# Patient Record
Sex: FEMALE | HISPANIC OR LATINO | Employment: PART TIME | URBAN - METROPOLITAN AREA
[De-identification: names, ages, dates, MRNs, and addresses within clinical notes are randomized per-mention and may not be internally consistent; named-entity substitution may affect disease eponyms.]

---

## 2022-05-03 ENCOUNTER — OCCMED (OUTPATIENT)
Dept: URGENT CARE | Facility: CLINIC | Age: 27
End: 2022-05-03
Payer: OTHER MISCELLANEOUS

## 2022-05-03 DIAGNOSIS — S16.1XXA CERVICAL STRAIN, ACUTE, INITIAL ENCOUNTER: Primary | ICD-10-CM

## 2022-05-03 PROCEDURE — G0382 LEV 3 HOSP TYPE B ED VISIT: HCPCS | Performed by: PHYSICIAN ASSISTANT

## 2022-05-03 PROCEDURE — 99283 EMERGENCY DEPT VISIT LOW MDM: CPT | Performed by: PHYSICIAN ASSISTANT

## 2022-05-03 RX ORDER — CYCLOBENZAPRINE HCL 10 MG
5 TABLET ORAL
Qty: 5 TABLET | Refills: 0 | Status: SHIPPED | OUTPATIENT
Start: 2022-05-03

## 2022-05-06 ENCOUNTER — APPOINTMENT (OUTPATIENT)
Dept: RADIOLOGY | Facility: MEDICAL CENTER | Age: 27
End: 2022-05-06
Payer: OTHER MISCELLANEOUS

## 2022-05-06 ENCOUNTER — OCCMED (OUTPATIENT)
Dept: URGENT CARE | Facility: MEDICAL CENTER | Age: 27
End: 2022-05-06
Payer: OTHER MISCELLANEOUS

## 2022-05-06 DIAGNOSIS — S16.1XXD CERVICAL STRAIN, ACUTE, SUBSEQUENT ENCOUNTER: ICD-10-CM

## 2022-05-06 DIAGNOSIS — S16.1XXD CERVICAL STRAIN, ACUTE, SUBSEQUENT ENCOUNTER: Primary | ICD-10-CM

## 2022-05-06 PROCEDURE — 99213 OFFICE O/P EST LOW 20 MIN: CPT | Performed by: PHYSICIAN ASSISTANT

## 2022-05-06 PROCEDURE — 72040 X-RAY EXAM NECK SPINE 2-3 VW: CPT

## 2022-05-09 ENCOUNTER — APPOINTMENT (OUTPATIENT)
Dept: URGENT CARE | Facility: MEDICAL CENTER | Age: 27
End: 2022-05-09
Payer: OTHER MISCELLANEOUS

## 2022-05-09 PROCEDURE — 99213 OFFICE O/P EST LOW 20 MIN: CPT | Performed by: PHYSICIAN ASSISTANT

## 2022-05-13 ENCOUNTER — APPOINTMENT (OUTPATIENT)
Dept: URGENT CARE | Facility: MEDICAL CENTER | Age: 27
End: 2022-05-13
Payer: OTHER MISCELLANEOUS

## 2022-05-13 PROCEDURE — 99213 OFFICE O/P EST LOW 20 MIN: CPT | Performed by: PHYSICIAN ASSISTANT

## 2022-05-17 ENCOUNTER — APPOINTMENT (OUTPATIENT)
Dept: URGENT CARE | Facility: MEDICAL CENTER | Age: 27
End: 2022-05-17
Payer: OTHER MISCELLANEOUS

## 2022-05-17 PROCEDURE — 99213 OFFICE O/P EST LOW 20 MIN: CPT | Performed by: PHYSICIAN ASSISTANT

## 2022-06-10 ENCOUNTER — OFFICE VISIT (OUTPATIENT)
Dept: FAMILY MEDICINE CLINIC | Facility: CLINIC | Age: 27
End: 2022-06-10

## 2022-06-10 VITALS
TEMPERATURE: 97.9 F | DIASTOLIC BLOOD PRESSURE: 78 MMHG | SYSTOLIC BLOOD PRESSURE: 124 MMHG | HEART RATE: 52 BPM | HEIGHT: 64 IN | BODY MASS INDEX: 20.96 KG/M2 | WEIGHT: 122.8 LBS | OXYGEN SATURATION: 98 %

## 2022-06-10 DIAGNOSIS — Z76.89 ENCOUNTER TO ESTABLISH CARE WITH NEW DOCTOR: Primary | ICD-10-CM

## 2022-06-10 DIAGNOSIS — B35.1 ONYCHOMYCOSIS: ICD-10-CM

## 2022-06-10 PROCEDURE — 99203 OFFICE O/P NEW LOW 30 MIN: CPT | Performed by: FAMILY MEDICINE

## 2022-06-10 NOTE — PROGRESS NOTES
Assessment/Plan:    No problem-specific Assessment & Plan notes found for this encounter  Diagnoses and all orders for this visit:    Encounter to establish care with new doctor    Onychomycosis  Discussed nail cut backs, antifungal topical treatment and vitamin supplementation to support nail growth  -     ciclopirox (PENLAC) 8 % solution; Apply topically daily at bedtime        Subjective:      Patient ID: Juan Luis Tirado is a 28 y o  female  HPI     Patient presents to the office to establish care  States that she does not want to complete her physical today  She denies any medical problems  Does not take any medications  States that she has a toenail fungus on her right great toe  This has been present for awhile  She has not used any medications  States that she has been trimming it back  The following portions of the patient's history were reviewed and updated as appropriate:   She  has no past medical history on file  She There are no problems to display for this patient  She  has no past surgical history on file  Her family history includes Diabetes in her maternal grandfather  She  reports that she has never smoked  She has never used smokeless tobacco  She reports current alcohol use  She reports that she does not use drugs  Current Outpatient Medications   Medication Sig Dispense Refill    ciclopirox (PENLAC) 8 % solution Apply topically daily at bedtime 6 6 mL 0    cyclobenzaprine (FLEXERIL) 10 mg tablet Take 0 5 tablets (5 mg total) by mouth daily at bedtime (Patient not taking: Reported on 6/10/2022) 5 tablet 0     No current facility-administered medications for this visit  She has No Known Allergies       Review of Systems   Constitutional: Negative for activity change, appetite change, chills, fatigue and fever  HENT: Negative for congestion, ear pain, rhinorrhea and sore throat  Respiratory: Negative for cough and shortness of breath      Cardiovascular: Negative for chest pain and leg swelling  Gastrointestinal: Negative for abdominal distention, abdominal pain, constipation, diarrhea, nausea and vomiting  Genitourinary: Negative for dysuria, frequency and urgency  Musculoskeletal: Negative for gait problem  Skin: Negative for rash  Neurological: Negative for dizziness, light-headedness and headaches  Objective:  /78 (BP Location: Left arm, Patient Position: Sitting, Cuff Size: Adult)   Pulse (!) 52   Temp 97 9 °F (36 6 °C)   Ht 5' 4" (1 626 m)   Wt 55 7 kg (122 lb 12 8 oz)   SpO2 98%   BMI 21 08 kg/m²      Physical Exam  Vitals reviewed  Constitutional:       General: She is not in acute distress  Appearance: Normal appearance  HENT:      Head: Normocephalic and atraumatic  Right Ear: External ear normal       Left Ear: External ear normal       Mouth/Throat:      Mouth: Mucous membranes are moist    Eyes:      Extraocular Movements: Extraocular movements intact  Conjunctiva/sclera: Conjunctivae normal    Cardiovascular:      Rate and Rhythm: Normal rate and regular rhythm  Heart sounds: Normal heart sounds  Pulmonary:      Effort: Pulmonary effort is normal       Breath sounds: No wheezing, rhonchi or rales  Musculoskeletal:      Right lower leg: No edema  Left lower leg: No edema  Skin:     General: Skin is warm  Capillary Refill: Capillary refill takes less than 2 seconds  Comments: Right great tow with white/yellow discoloration, mild thickening distally  Portion of the toenail has been cut back  No involvement of the cuticle  No redness  Neurological:      Mental Status: She is alert  Mental status is at baseline             DO Sara Foote [facility]  6/10/2022 8:45 AM

## 2023-01-24 ENCOUNTER — OFFICE VISIT (OUTPATIENT)
Dept: OBGYN CLINIC | Facility: CLINIC | Age: 28
End: 2023-01-24

## 2023-01-24 VITALS
DIASTOLIC BLOOD PRESSURE: 73 MMHG | HEART RATE: 76 BPM | WEIGHT: 121 LBS | SYSTOLIC BLOOD PRESSURE: 115 MMHG | BODY MASS INDEX: 20.66 KG/M2 | HEIGHT: 64 IN

## 2023-01-24 DIAGNOSIS — N76.0 BACTERIAL VAGINOSIS: Primary | ICD-10-CM

## 2023-01-24 DIAGNOSIS — B96.89 BACTERIAL VAGINOSIS: Primary | ICD-10-CM

## 2023-01-24 DIAGNOSIS — N76.0 VAGINAL INFECTION: ICD-10-CM

## 2023-01-24 LAB — SL AMB POCT URINE HCG: NEGATIVE

## 2023-01-24 RX ORDER — METRONIDAZOLE 500 MG/1
500 TABLET ORAL EVERY 12 HOURS SCHEDULED
Qty: 14 TABLET | Refills: 0 | Status: SHIPPED | OUTPATIENT
Start: 2023-01-24 | End: 2023-01-31

## 2023-01-24 NOTE — ASSESSMENT & PLAN NOTE
-Patient presents with c/o of vaginal discharge, irritation and dysuria  Onset 3 weeks ago  No other acute or systemic complains  Patient declines STD testing    - POCT UA: Unremarkable, negative leukocytes or nitrites  POCT Hcg: Negative  - Clue cells noted on wet mount and basic PH noted consistent with BV   - Will treat with Metronidazole 500mg BID X 7 days  - Discussed with patient about avoiding of scented soaps to vaginal area/hygiene after sexual intercourse

## 2023-01-24 NOTE — PROGRESS NOTES
Assessment/Plan:    Bacterial vaginosis  -Patient presents with c/o of vaginal discharge, irritation and dysuria  Onset 3 weeks ago  No other acute or systemic complains  Patient declines STD testing    - POCT UA: Unremarkable, negative leukocytes or nitrites  POCT Hcg: Negative  - Clue cells noted on wet mount and basic PH noted consistent with BV   - Will treat with Metronidazole 500mg BID X 7 days  - Discussed with patient about avoiding of scented soaps to vaginal area/hygiene after sexual intercourse  Diagnoses and all orders for this visit:    Bacterial vaginosis    Vaginal infection  -     POCT urine HCG  -     metroNIDAZOLE (FLAGYL) 500 mg tablet; Take 1 tablet (500 mg total) by mouth every 12 (twelve) hours for 7 days          Subjective:      Patient ID: Tomasz Luis is a 33 y o  female  Patient is a 33 Y O Female who [facility] due to concerns of vaginal discharge and irritation  She reports symptoms have been ongoing for the past 2- 3 weeks  She reports large amount of discharge, requiring her to wear pads but denies vaginal odor but burning with urination  She has not tried any medication to help with symptoms  Denies changes to soap, detergent etc and any other systemic symptoms  Currently sexually active with one male partner  LMP: 2023  She reports that periods are regular  She has no children, but reports having an  about 12 years ago  The following portions of the patient's history were reviewed and updated as appropriate: allergies, current medications, past family history, past medical history, past social history, past surgical history and problem list     Review of Systems   Constitutional: Negative for chills and fever  Genitourinary: Positive for dysuria and vaginal discharge  Negative for difficulty urinating, dyspareunia, frequency, hematuria, menstrual problem and vaginal bleeding           Objective:      BP 115/73   Pulse 76   Ht 5' 4" (1 626 m)   Wt 54 9 kg (121 lb)   LMP 01/07/2023   BMI 20 77 kg/m²          Physical Exam  Vitals reviewed  Constitutional:       General: She is not in acute distress  Appearance: Normal appearance  She is not ill-appearing, toxic-appearing or diaphoretic  HENT:      Head: Normocephalic and atraumatic  Cardiovascular:      Rate and Rhythm: Normal rate and regular rhythm  Pulses: Normal pulses  Pulmonary:      Effort: Pulmonary effort is normal  No respiratory distress  Genitourinary:     General: Normal vulva  Vagina: Vaginal discharge present  Comments: Whitish vaginal discharge noted on speculum exam  No vaginal irritation/ erythema/ odor noted  Musculoskeletal:         General: Normal range of motion  Right lower leg: No edema  Left lower leg: No edema  Skin:     General: Skin is warm and dry  Neurological:      Mental Status: She is alert  Mental status is at baseline  Psychiatric:         Mood and Affect: Mood normal          Behavior: Behavior normal          Thought Content:  Thought content normal          Judgment: Judgment normal

## 2023-06-27 ENCOUNTER — OFFICE VISIT (OUTPATIENT)
Dept: FAMILY MEDICINE CLINIC | Facility: CLINIC | Age: 28
End: 2023-06-27
Payer: COMMERCIAL

## 2023-06-27 VITALS
SYSTOLIC BLOOD PRESSURE: 100 MMHG | HEART RATE: 82 BPM | TEMPERATURE: 98.1 F | HEIGHT: 64 IN | OXYGEN SATURATION: 98 % | RESPIRATION RATE: 16 BRPM | WEIGHT: 119.4 LBS | DIASTOLIC BLOOD PRESSURE: 62 MMHG | BODY MASS INDEX: 20.38 KG/M2

## 2023-06-27 DIAGNOSIS — N92.6 MISSED MENSES: ICD-10-CM

## 2023-06-27 DIAGNOSIS — Z12.4 CERVICAL CANCER SCREENING: ICD-10-CM

## 2023-06-27 DIAGNOSIS — Z00.00 ANNUAL PHYSICAL EXAM: Primary | ICD-10-CM

## 2023-06-27 DIAGNOSIS — Z11.4 ENCOUNTER FOR SCREENING FOR HIV: ICD-10-CM

## 2023-06-27 DIAGNOSIS — Z11.59 NEED FOR HEPATITIS C SCREENING TEST: ICD-10-CM

## 2023-06-27 DIAGNOSIS — B35.1 ONYCHOMYCOSIS: ICD-10-CM

## 2023-06-27 PROCEDURE — 99395 PREV VISIT EST AGE 18-39: CPT | Performed by: NURSE PRACTITIONER

## 2023-06-27 NOTE — ASSESSMENT & PLAN NOTE
Annual physical completed  Patient had missed menses x3 weeks  Blood work ordered  Referral made to OB  Discussed taking prenatal vitamins, maintain nutrition  Pt is hypotensive, asymptomatic   Increase fluid hydration

## 2023-06-27 NOTE — PROGRESS NOTES
ADULT ANNUAL PHYSICAL  [facility] PRIMARY CARE    NAME: Dayday Corporal  AGE: 33 y o  SEX: female  : 1995     DATE: 2023     Assessment and Plan:     Problem List Items Addressed This Visit        Other    Annual physical exam - Primary     Annual physical completed  Patient had missed menses x3 weeks  Blood work ordered  Referral made to OB  Discussed taking prenatal vitamins, maintain nutrition  Pt is hypotensive, asymptomatic  Increase fluid hydration           Relevant Orders    CBC and differential    Comprehensive metabolic panel    Lipid panel    TSH, 3rd generation with Free T4 reflex   Other Visit Diagnoses     Onychomycosis        Relevant Medications    ciclopirox (PENLAC) 8 % solution    Need for hepatitis C screening test        Relevant Orders    Hepatitis C antibody    Encounter for screening for HIV        Relevant Orders    HIV 1/2 AG/AB w Reflex [facility] for 2 yr old and above    Missed menses        Cervical cancer screening        Relevant Orders    Ambulatory Referral to Obstetrics / Gynecology          Immunizations and preventive care screenings were discussed with patient today  Appropriate education was printed on patient's after visit summary  Counseling:  Alcohol/drug use: discussed moderation in alcohol intake, the recommendations for healthy alcohol use, and avoidance of illicit drug use  Dental Health: discussed importance of regular tooth brushing, flossing, and dental visits  Injury prevention: discussed safety/seat belts, safety helmets, smoke detectors, carbon dioxide detectors, and smoking near bedding or upholstery  Sexual health: discussed sexually transmitted diseases, partner selection, use of condoms, avoidance of unintended pregnancy, and contraceptive alternatives  Exercise: the importance of regular exercise/physical activity was discussed   Recommend exercise 3-5 times per week for at least 30 minutes  Depression Screening and Follow-up Plan: Patient was screened for depression during today's encounter  They screened negative with a PHQ-2 score of 0  No follow-ups on file  Chief Complaint:     Chief Complaint   Patient presents with   • Establish Care     Patient is here to establish care with new PCP      History of Present Illness:     Adult Annual Physical   Patient here for a comprehensive physical exam  The patient reports problems - missed menses, lesion on back, toenail fungus  Diet and Physical Activity  Diet/Nutrition: well balanced diet  Exercise: no formal exercise  Depression Screening  PHQ-2/9 Depression Screening    Little interest or pleasure in doing things: 0 - not at all  Feeling down, depressed, or hopeless: 0 - not at all  PHQ-2 Score: 0  PHQ-2 Interpretation: Negative depression screen       General Health  Sleep: sleeps well  Hearing: normal - bilateral   Vision: no vision problems and most recent eye exam >1 year ago  Dental: regular dental visits  /GYN Health  Last menstrual period: 3 weeks ago  Contraceptive method: mo, possibly pregnant  History of STDs?: no      Review of Systems:     Review of Systems   Constitutional: Negative  HENT: Negative  Eyes: Negative  Respiratory: Negative  Cardiovascular: Negative  Gastrointestinal: Negative  Endocrine: Negative  Genitourinary: Positive for menstrual problem (missed menses x 3 weeks)  Musculoskeletal: Negative  Skin: Negative  Allergic/Immunologic: Negative  Neurological: Negative  Psychiatric/Behavioral: Negative  Past Medical History:     History reviewed  No pertinent past medical history     Past Surgical History:     Past Surgical History:   Procedure Laterality Date   • LASIK Bilateral 2022      Social History:     Social History     Socioeconomic History   • Marital status: /Civil Union     Spouse name: None   • Number of children: None   • Years of education: None   • Highest education level: None   Occupational History   • None   Tobacco Use   • Smoking status: Never   • Smokeless tobacco: Never   Vaping Use   • Vaping Use: Some days   • Substances: THC   Substance and Sexual Activity   • Alcohol use: Yes     Comment: socially   • Drug use: Never   • Sexual activity: Yes     Partners: Male     Birth control/protection: None   Other Topics Concern   • None   Social History Narrative   • None     Social Determinants of Health     Financial Resource Strain: Low Risk  (1/24/2023)    Overall Financial Resource Strain (CARDIA)    • Difficulty of Paying Living Expenses: Not hard at all   Food Insecurity: No Food Insecurity (1/24/2023)    Hunger Vital Sign    • Worried About Running Out of Food in the Last Year: Never true    • Ran Out of Food in the Last Year: Never true   Transportation Needs: No Transportation Needs (1/24/2023)    PRAPARE - Transportation    • Lack of Transportation (Medical): No    • Lack of Transportation (Non-Medical): No   Physical Activity: Not on file   Stress: Not on file   Social Connections: Not on file   Intimate Partner Violence: Not on file   Housing Stability: Low Risk  (1/24/2023)    Housing Stability Vital Sign    • Unable to Pay for Housing in the Last Year: No    • Number of Places Lived in the Last Year: 1    • Unstable Housing in the Last Year: No      Family History:     Family History   Problem Relation Age of Onset   • No Known Problems Mother    • No Known Problems Father    • Diabetes Maternal Grandfather       Current Medications:     Current Outpatient Medications   Medication Sig Dispense Refill   • ciclopirox (PENLAC) 8 % solution Apply topically daily at bedtime 6 6 mL 0   • cyclobenzaprine (FLEXERIL) 10 mg tablet Take 0 5 tablets (5 mg total) by mouth daily at bedtime (Patient not taking: Reported on 6/27/2023) 5 tablet 0     No current facility-administered medications for this visit        Allergies:     No "Known Allergies   Physical Exam:     /62   Pulse 82   Temp 98 1 °F (36 7 °C) (Temporal)   Resp 16   Ht 5' 4\" (1 626 m)   Wt 54 2 kg (119 lb 6 4 oz)   LMP 05/05/2023   SpO2 98%   BMI 20 49 kg/m²     Physical Exam  Constitutional:       General: She is not in acute distress  Appearance: Normal appearance  She is not ill-appearing  HENT:      Head: Normocephalic and atraumatic  Nose: Nose normal       Mouth/Throat:      Mouth: Mucous membranes are moist    Eyes:      Pupils: Pupils are equal, round, and reactive to light  Cardiovascular:      Rate and Rhythm: Normal rate and regular rhythm  Pulses: Normal pulses  Heart sounds: Normal heart sounds  Pulmonary:      Effort: Pulmonary effort is normal  No respiratory distress  Breath sounds: Normal breath sounds  Chest:      Chest wall: No tenderness  Abdominal:      General: Abdomen is flat  Bowel sounds are normal  There is no distension  Palpations: There is no mass  Tenderness: There is no abdominal tenderness  Musculoskeletal:         General: Normal range of motion  Cervical back: Normal range of motion and neck supple  Skin:     General: Skin is warm and dry  Findings: Rash (mid back) present  Comments: onchymosis rt great toe   Neurological:      General: No focal deficit present  Mental Status: She is alert and oriented to person, place, and time  Psychiatric:         Mood and Affect: Mood normal          Behavior: Behavior normal          Thought Content:  Thought content normal          Judgment: Judgment normal           DON Rivero   [address] CARE  "

## 2023-06-30 ENCOUNTER — APPOINTMENT (OUTPATIENT)
Dept: LAB | Facility: HOSPITAL | Age: 28
End: 2023-06-30
Payer: COMMERCIAL

## 2023-06-30 ENCOUNTER — TELEPHONE (OUTPATIENT)
Dept: FAMILY MEDICINE CLINIC | Facility: CLINIC | Age: 28
End: 2023-06-30

## 2023-06-30 DIAGNOSIS — Z00.00 ANNUAL PHYSICAL EXAM: ICD-10-CM

## 2023-06-30 DIAGNOSIS — N92.6 MISSED MENSES: Primary | ICD-10-CM

## 2023-06-30 DIAGNOSIS — Z11.59 NEED FOR HEPATITIS C SCREENING TEST: ICD-10-CM

## 2023-06-30 DIAGNOSIS — Z11.4 ENCOUNTER FOR SCREENING FOR HIV: ICD-10-CM

## 2023-06-30 LAB
ALBUMIN SERPL BCP-MCNC: 4.3 G/DL (ref 3.5–5)
ALP SERPL-CCNC: 30 U/L (ref 34–104)
ALT SERPL W P-5'-P-CCNC: 9 U/L (ref 7–52)
ANION GAP SERPL CALCULATED.3IONS-SCNC: 6 MMOL/L
AST SERPL W P-5'-P-CCNC: 15 U/L (ref 13–39)
BASOPHILS # BLD AUTO: 0.04 THOUSANDS/ÂΜL (ref 0–0.1)
BASOPHILS NFR BLD AUTO: 1 % (ref 0–1)
BILIRUB SERPL-MCNC: 0.63 MG/DL (ref 0.2–1)
BUN SERPL-MCNC: 12 MG/DL (ref 5–25)
CALCIUM SERPL-MCNC: 9.3 MG/DL (ref 8.4–10.2)
CHLORIDE SERPL-SCNC: 104 MMOL/L (ref 96–108)
CHOLEST SERPL-MCNC: 131 MG/DL
CO2 SERPL-SCNC: 24 MMOL/L (ref 21–32)
CREAT SERPL-MCNC: 0.69 MG/DL (ref 0.6–1.3)
EOSINOPHIL # BLD AUTO: 0.03 THOUSAND/ÂΜL (ref 0–0.61)
EOSINOPHIL NFR BLD AUTO: 0 % (ref 0–6)
ERYTHROCYTE [DISTWIDTH] IN BLOOD BY AUTOMATED COUNT: 11.9 % (ref 11.6–15.1)
GFR SERPL CREATININE-BSD FRML MDRD: 118 ML/MIN/1.73SQ M
GLUCOSE P FAST SERPL-MCNC: 89 MG/DL (ref 65–99)
HCT VFR BLD AUTO: 37.6 % (ref 34.8–46.1)
HCV AB SER QL: NORMAL
HDLC SERPL-MCNC: 52 MG/DL
HGB BLD-MCNC: 12.4 G/DL (ref 11.5–15.4)
HIV 1+2 AB+HIV1 P24 AG SERPL QL IA: NORMAL
HIV 2 AB SERPL QL IA: NORMAL
HIV1 AB SERPL QL IA: NORMAL
HIV1 P24 AG SERPL QL IA: NORMAL
IMM GRANULOCYTES # BLD AUTO: 0.03 THOUSAND/UL (ref 0–0.2)
IMM GRANULOCYTES NFR BLD AUTO: 0 % (ref 0–2)
LDLC SERPL CALC-MCNC: 69 MG/DL (ref 0–100)
LYMPHOCYTES # BLD AUTO: 1.61 THOUSANDS/ÂΜL (ref 0.6–4.47)
LYMPHOCYTES NFR BLD AUTO: 19 % (ref 14–44)
MCH RBC QN AUTO: 30.2 PG (ref 26.8–34.3)
MCHC RBC AUTO-ENTMCNC: 33 G/DL (ref 31.4–37.4)
MCV RBC AUTO: 92 FL (ref 82–98)
MONOCYTES # BLD AUTO: 0.42 THOUSAND/ÂΜL (ref 0.17–1.22)
MONOCYTES NFR BLD AUTO: 5 % (ref 4–12)
NEUTROPHILS # BLD AUTO: 6.15 THOUSANDS/ÂΜL (ref 1.85–7.62)
NEUTS SEG NFR BLD AUTO: 75 % (ref 43–75)
NONHDLC SERPL-MCNC: 79 MG/DL
NRBC BLD AUTO-RTO: 0 /100 WBCS
PLATELET # BLD AUTO: 335 THOUSANDS/UL (ref 149–390)
PMV BLD AUTO: 9 FL (ref 8.9–12.7)
POTASSIUM SERPL-SCNC: 3.9 MMOL/L (ref 3.5–5.3)
PROT SERPL-MCNC: 7.7 G/DL (ref 6.4–8.4)
RBC # BLD AUTO: 4.11 MILLION/UL (ref 3.81–5.12)
SODIUM SERPL-SCNC: 134 MMOL/L (ref 135–147)
TRIGL SERPL-MCNC: 49 MG/DL
TSH SERPL DL<=0.05 MIU/L-ACNC: 1.16 UIU/ML (ref 0.45–4.5)
WBC # BLD AUTO: 8.28 THOUSAND/UL (ref 4.31–10.16)

## 2023-06-30 PROCEDURE — 84443 ASSAY THYROID STIM HORMONE: CPT

## 2023-06-30 PROCEDURE — 87389 HIV-1 AG W/HIV-1&-2 AB AG IA: CPT

## 2023-06-30 PROCEDURE — 80053 COMPREHEN METABOLIC PANEL: CPT

## 2023-06-30 PROCEDURE — 86803 HEPATITIS C AB TEST: CPT

## 2023-06-30 PROCEDURE — 36415 COLL VENOUS BLD VENIPUNCTURE: CPT

## 2023-06-30 PROCEDURE — 80061 LIPID PANEL: CPT

## 2023-06-30 PROCEDURE — 85025 COMPLETE CBC W/AUTO DIFF WBC: CPT

## 2023-07-05 ENCOUNTER — ULTRASOUND (OUTPATIENT)
Dept: OBGYN CLINIC | Facility: CLINIC | Age: 28
End: 2023-07-05
Payer: COMMERCIAL

## 2023-07-05 VITALS — WEIGHT: 122.2 LBS | BODY MASS INDEX: 20.98 KG/M2

## 2023-07-05 DIAGNOSIS — N91.1 SECONDARY AMENORRHEA: Primary | ICD-10-CM

## 2023-07-05 NOTE — PROGRESS NOTES
FIRST TRIMESTER OBSTETRIC ULTRASOUND     Patient's last menstrual period was 2023. INDICATION: Establish Gestational Age      Additional Findings: none     FHR: 160 bpm     IMPRESSION:  Single intrauterine pregnancy of 7w3d gestational age  Fetal cardiac activity detected. No adnexal masses seen    Assigning a Final JED  Please choose how you are assigning the JED: The gestational age by LMP is 9w 0d - 13w 6d and demonstrates more than 7 days difference from the gestational age by [place], therefore the final JED will be based on the ultrasound at this encounter    Final JED: 2024 by ultrasound at this encounter. Olivier Villegas [place] [address]  [address] [address]  Dept: [phone]  Dept Fax: [phone]    Ultrasound Probe Disinfection    A transvaginal ultrasound was performed. Prior to use, disinfection was performed with High Level Disinfection Process ([employer]folder[employer]). Probe serial number F: [id number] was used.

## 2023-07-05 NOTE — PROGRESS NOTES
Patient here for early [address] LMP: 23; periods are irregular. 9w/1d; JED 24  ; this is a second pregnancy. Pregnancy not planned, but welcomed. She is accompanied by her  Valeria Marsh. Symptoms: Nausea, vomiting, cramping & breast tenderness. She denies spotting.

## 2023-07-06 ENCOUNTER — TELEPHONE (OUTPATIENT)
Facility: HOSPITAL | Age: 28
End: 2023-07-06

## 2023-07-06 NOTE — TELEPHONE ENCOUNTER
Called patient to schedule MFM appointment, based on referral issued to Maternal Fetal Medicine by [facility] office. Left voicemail requesting patient to call back and schedule appointment, with office number for return call [phone].

## 2023-07-10 ENCOUNTER — TELEPHONE (OUTPATIENT)
Facility: HOSPITAL | Age: 28
End: 2023-07-10

## 2023-07-10 NOTE — TELEPHONE ENCOUNTER
Called patient to schedule MFM appointment, based on referral issued to Maternal Fetal Medicine by [facility] office. Left voicemail requesting patient to call back and schedule appointment, with office number for return call [phone].

## 2023-07-13 ENCOUNTER — TELEPHONE (OUTPATIENT)
Facility: HOSPITAL | Age: 28
End: 2023-07-13

## 2023-07-13 NOTE — TELEPHONE ENCOUNTER
Called patient to schedule MFM appointment, based on referral issued to Maternal Fetal Medicine by [facility] office. Our office has tried multiple times to contact this patient and schedule a nuchal translucency appointment as indicated in referral. Our office has tried to contact this patient on 7/6, 7/10 and 7/13 but patient has not returned any phone calls to our office to schedule nahomi appointment.

## 2023-08-01 ENCOUNTER — TELEPHONE (OUTPATIENT)
Dept: OBGYN CLINIC | Facility: CLINIC | Age: 28
End: 2023-08-01

## 2023-08-01 NOTE — TELEPHONE ENCOUNTER
2nd attempt to reach patient for prenatal intake. Called twice. Left message. In take needs to be complete prior to PN 1 visit on 8/11.

## 2023-08-09 PROBLEM — Z00.00 ANNUAL PHYSICAL EXAM: Status: RESOLVED | Noted: 2023-06-27 | Resolved: 2023-08-09

## 2023-08-09 NOTE — PATIENT INSTRUCTIONS
Thank you for choosing us for your  care today. If you have any questions about your ultrasound or care, please do not hesitate to contact us or your primary obstetrician. Some general instructions for your pregnancy are:    Exercise: Aim for 22 minutes per day (150 minutes per week) of regular exercise. Walking is great! Nutrition: Choose healthy sources of calcium, iron, and protein. Learn about Preeclampsia: preeclampsia is a common, serious high blood pressure complication in pregnancy. A blood pressure of 726KQUJ (systolic or top number) or 23DSGD (diastolic or bottom number) is not normal and needs evaluation by your doctor. Aspirin is sometimes prescribed in early pregnancy to prevent preeclampsia in women with risk factors - ask your obstetrician if you should be on this medication. For more resources, visit:  MapCoverage.fi  If you smoke, try to reduce how many cigarettes you smoke or try to quit completely. Do not vape. Other warning signs to watch out for in pregnancy or postpartum: chest pain, obstructed breathing or shortness of breath, seizures, thoughts of hurting yourself or your baby, bleeding, a painful or swollen leg, fever, or headache (see [facility] POST-BIRTH Warning Signs campaign). If these happen call 911. Itching is also not normal in pregnancy and if you experience this, especially over your hands and feet, potentially worse at night, notify your doctors.

## 2023-08-10 ENCOUNTER — ROUTINE PRENATAL (OUTPATIENT)
Dept: PERINATAL CARE | Facility: OTHER | Age: 28
End: 2023-08-10
Payer: COMMERCIAL

## 2023-08-10 VITALS
HEIGHT: 64 IN | HEART RATE: 82 BPM | SYSTOLIC BLOOD PRESSURE: 100 MMHG | DIASTOLIC BLOOD PRESSURE: 54 MMHG | BODY MASS INDEX: 21 KG/M2 | WEIGHT: 123 LBS

## 2023-08-10 DIAGNOSIS — Z3A.12 12 WEEKS GESTATION OF PREGNANCY: ICD-10-CM

## 2023-08-10 DIAGNOSIS — N91.1 SECONDARY AMENORRHEA: ICD-10-CM

## 2023-08-10 DIAGNOSIS — Z36.82 ENCOUNTER FOR (NT) NUCHAL TRANSLUCENCY SCAN: Primary | ICD-10-CM

## 2023-08-10 PROCEDURE — 76813 OB US NUCHAL MEAS 1 GEST: CPT | Performed by: OBSTETRICS & GYNECOLOGY

## 2023-08-10 NOTE — PROGRESS NOTES
[address]: Ms. Blanca Whittaker was seen today at 12w4d for nuchal translucency ultrasound. See ultrasound report under "OB Procedures" tab. Our  recommendations are as follows:  1. We reviewed the availability of genetic screening, as well as diagnostic testing, which are available to all pregnant women. We reviewed limitations, risks, and benefits of screening and testing. She elected to proceed with Non Invasive Prenatal Screening (NIPS). MSAFP screening should be ordered through your office at 15-20 weeks gestation, and completed prior to fetal anatomic survey. She does not wish to pursue diagnostic testing at this time. A detailed anatomic survey as well as transvaginal cervical length screening are recommended between 18-22 weeks gestation-scheduled 10/3/2023.     2. She has not completed her initial prenatal intake. She was encouraged to call [facility] to make appointments. The importance of prenatal care was discussed. 3. She and her partner just returned from travel to [facility]. She was concerned with insect bites she received and risk of Zika infection. We discussed symptoms to report such as fever, headaches, joint pain or conjunctivitis. She denies symptoms today.      Please don't hesitate to contact our office with any concerns or questions.    -Jayme Borges MD

## 2023-08-10 NOTE — PROGRESS NOTES
Patient chose to have [facility] Non-invasive Prenatal Screen with fetal sex. Patient given brochure and is aware [facility] will contact their insurance and coordinate coverage. Patient made aware she will need to respond to text message or e-mail from [address] within 2 business days or testing will be run through insurance. Patient informed text message will come from area code  "415". Provided [employer] # [phone] and web site : [email or web address].   "[place] your test online" card with barcode and test tube ID provided to patient. Reviewed [facility] web site states 5-7 business days for results via their portal.   Talisma message will be sent to patient when [facility] receives results /provider reviews. 2 vials of blood drawn from  right arm by CHANDRA Velasquez RN. Patient tolerated blood draw without difficulty. Specimens labeled with patient identifiers (name, date of birth, specimen collection date), order and specimen were verified with patient, packed and sent via [address]. FED EX  tracking #  [id number]  Copy of lab order scanned to Epic media. Maternal Fetal Medicine will have results in approximately 7-10 business days and will call patient or notify via [address] [place] [address]. Patient aware viewing lab result online will reveal fetal sex if ordered. Patient verbalized understanding of all instructions and no questions at this time.

## 2023-08-10 NOTE — LETTER
August 10, 2023     Lali Soriano PA-C  [address] [place]    Patient: Cornelius Ventura   YOB: 1995   Date of Visit: 8/10/2023       Dear Pedro Coburn: Thank you for referring Dez Richardson to me for evaluation. Below are my notes for this consultation. If you have questions, please do not hesitate to call me. I look forward to following your patient along with you. Sincerely,        Shahana Foster MD        CC: No Recipients    Shahana Foster MD  8/10/2023  4:41 PM  Sign when Signing Visit  [address]: Ms. Evans Escalona was seen today at 12w4d for nuchal translucency ultrasound. See ultrasound report under "OB Procedures" tab. Our  recommendations are as follows:  1. We reviewed the availability of genetic screening, as well as diagnostic testing, which are available to all pregnant women. We reviewed limitations, risks, and benefits of screening and testing. She elected to proceed with Non Invasive Prenatal Screening (NIPS). MSAFP screening should be ordered through your office at 15-20 weeks gestation, and completed prior to fetal anatomic survey. She does not wish to pursue diagnostic testing at this time. A detailed anatomic survey as well as transvaginal cervical length screening are recommended between 18-22 weeks gestation-scheduled 10/3/2023.     2. She has not completed her initial prenatal intake. She was encouraged to call [facility] to make appointments. The importance of prenatal care was discussed. 3. She and her partner just returned from travel to [facility]. She was concerned with insect bites she received and risk of Zika infection. We discussed symptoms to report such as fever, headaches, joint pain or conjunctivitis. She denies symptoms today.      Please don't hesitate to contact our office with any concerns or questions.    -Shahana Foster MD

## 2023-08-13 NOTE — ASSESSMENT & PLAN NOTE
Brenda Villalpando is a 33y.o. year old  at 13w3d who presents for Initial prenatal visit. 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 Denies pelvic pain, bleeding, LOF. They just got back from vacation in [facility], she did have some diarrhea but that has resolved   Exam WNL. Prenatal labs Not completed, OB intake was today. Pap& GC/CT sent today. Patient Education: Patient was counseled regarding diet, exercise, weight gain, foods to avoid, vaccines in pregnancy, aneuploidy screening, travel precautions to include seat belt use and VTE risk reduction. She has been provided our pregnancy packet which includes pregnancy warning signs,how and when to contact providers, visit intervals, Maternal fetal medicine information, medication recommendations that are safe during pregnancy, dietary suggestions, activity recommendations, breastfeeding information as well as websites for additional information, and delivery location. No past medical history on file. Past Surgical History:   Procedure Laterality Date   • LASIK Bilateral        There is no immunization history on file for this patient.       Family History   Problem Relation Age of Onset   • No Known Problems Mother    • No Known Problems Father    • No Known Problems Brother    • Osteoarthritis Maternal Grandmother    • Diabetes Maternal Grandfather    • Hypertension Maternal Grandfather    • Diabetes Paternal Grandfather      Social History     Tobacco Use   • Smoking status: Never   • Smokeless tobacco: Never   Vaping Use   • Vaping Use: Former   • Substances: THC   Substance Use Topics   • Alcohol use: Not Currently   • Drug use: Never       Current Outpatient Medications:   •  ciclopirox (PENLAC) 8 % solution, Apply topically daily at bedtime (Patient not taking: Reported on 8/15/2023), Disp: 6.6 mL, Rfl: 0  •  Prenatal Vit-Fe Fumarate-FA (PRENATAL VITAMIN PO), Take by mouth, Disp: , Rfl:   Patient Active Problem List    Diagnosis Date Noted   • 12 weeks gestation of pregnancy 08/10/2023   • Vaginal infection 2023   • Bacterial vaginosis 2023       No Known Allergies    OB History    Para Term  AB Living   2       1     SAB IAB Ectopic Multiple Live Births                  # Outcome Date GA Lbr Akira/2nd Weight Sex Delivery Anes PTL Lv   2 Current            1 AB                Vitals:    23 1443   BP: 116/70   BP Location: Right arm   Patient Position: Sitting   Cuff Size: Large   Weight: 56.2 kg (124 lb)   Height: 5' 4" (1.626 m)     Body mass index is 21.28 kg/m².

## 2023-08-13 NOTE — PATIENT INSTRUCTIONS
Valuable Online Resource:    [facility] pregnancy essential guide    [email or web address]      On the right side of the screen is a 50 page guide providing valuable information about your entire pregnancy. On the left hand side of the site you will see several other links to great information and resources that [facility] offers     If you click on the tab that says "Pregnancy and Birth Packet" this opens another  guide to labor and delivery information as well as breast feeding information,  care, pediatricians, car seat safety and much more     The [facility] Baby and [place] tab has a virtual tour of the new L&D unit, as well as valuable information about classes that are offered, breast feeding support, support groups and much more. I highly recommend the virtual Breast Feeding class, very informational even if you have breast fed in the past. Check for available dates ! Click around and enjoy all we have to offer! Please note that all information in regards to locations and visiting hours have not been updated due to  [employer] delivery location is [address] [facility] @ [address] [place] [address]         Maternal Fetal Medicine     Nuchal Translucency ( NT) ultrasound is offered in the first trimester of pregnancy to assess your developing baby and look for any markers that may indicate a risk for certain chromosomal conditions such as Down's syndrome. This ultrasound is offered to all pregnant women along with several options for blood screening. During your ultrasound appointment the Perinatologist will discuss these options and together you can decide which screen is best for you. We encourage you to view the following video prior to your appointment to learn more:  [email or web address]     Please check your individual insurance plan to determine if the screening is covered, requires prior authorization or if you will incur any out of pocket cost.   It is also important to know if your insurance company has a preferred in network lab such as [facility] or [address] [place] [address].     Below is list of CPT codes for blood screening options. CPT codes are procedure codes that tell your insurance company what testing is being done. In order for testing to be performed in a timely and efficient manner it is best if you have this information available before your first visit with our office. Please note, [employer]'s genetic testing division is called [facility]. Sequential Screen - 2 part screen, CPT codes are dependent on the lab used:     [email or web address]    Part 1 code 36016,11412      Part 2 code 33169,[id number], [facility] 1 code  90249                Part 2 code 33032        NIPT (cell free DNA testing)  CPT code 43061        NIPT testing through [facility] is called [id number]. Please use the  @ wwwDittit   > click estimate my cost>  pregnancy>   [id number] plus  OR call # [phone] and speak to a . Be sure to ask about the Every [facility] program for additional financial assistance. NIPT testing through [facility] is called [facility].  Please use the  @ [employer]. If you have any questions please feel free to reach out to our office at [phone]. Genetic Counseling appointments are available for any patient but strongly encouraged for Moms 28 or older or patients with family history of genetic disorders. Pregnancy at 11 to 14 Weeks   AMBULATORY CARE:   Changes happening to your body: You are now at the end of your first trimester and entering your second trimester. Morning sickness usually goes away by this time. You may have other symptoms such as fatigue, frequent urination, and headaches. You may have gained 2 to 4 pounds by now.   Seek care immediately if:   You have pain or cramping in your abdomen or low back. You have heavy vaginal bleeding or clotting. You pass material that looks like tissue or large clots. Collect the material and bring it with you. Call your doctor or obstetrician if:   You cannot keep food or drinks down, and you are losing weight. You have light vaginal bleeding. You have chills or a fever. You have vaginal itching, burning, or pain. You have yellow, green, white, or foul-smelling vaginal discharge. You have pain or burning when you urinate, less urine than usual, or pink or bloody urine. You have questions or concerns about your condition or care. How to care for yourself at this stage of your pregnancy:       Get plenty of rest.  You may feel more tired than normal. You may need to take naps or go to bed earlier. Manage nausea and vomiting. Avoid fatty and spicy foods. Eat small meals throughout the day instead of large meals. Haley may help to decrease nausea. Ask your healthcare provider about other ways of decreasing nausea and vomiting. Eat a variety of healthy foods. Healthy foods include fruits, vegetables, whole-grain breads, low-fat dairy foods, beans, lean meats, and fish. Drink liquids as directed. Ask how much liquid to drink each day and which liquids are best for you. Limit caffeine to less than 200 milligrams each day. Limit your intake of fish to 2 servings each week. Choose fish low in mercury such as canned light tuna, shrimp, salmon, cod, or tilapia. Do not  eat fish high in mercury such as swordfish, tilefish, bella mackerel, and shark. Take prenatal vitamins as directed. Your need for certain vitamins and minerals, such as folic acid, increases during pregnancy. Prenatal vitamins provide some of the extra vitamins and minerals you need. Prenatal vitamins may also help to decrease the risk of certain birth defects. Do not smoke.   Smoking increases your risk of a miscarriage and other health problems during your pregnancy. Smoking can cause your baby to be born too early or weigh less at birth. Ask your healthcare provider for information if you need help quitting. Do not drink alcohol. Alcohol passes from your body to your baby through the placenta. It can affect your baby's brain development and cause fetal alcohol syndrome (FAS). FAS is a group of conditions that causes mental, behavior, and growth problems. Talk to your healthcare provider before you take any medicines. Many medicines may harm your baby if you take them when you are pregnant. Do not take any medicines, vitamins, herbs, or supplements without first talking to your healthcare provider. Never use illegal or street drugs (such as marijuana or cocaine) while you are pregnant. Safety tips during pregnancy:   Avoid hot tubs and saunas. Do not use a hot tub or sauna while you are pregnant, especially during your first trimester. Hot tubs and saunas may raise your baby's temperature and increase the risk of birth defects. Avoid toxoplasmosis. This is an infection caused by eating raw meat or being around infected cat feces. It can cause birth defects, miscarriages, and other problems. Wash your hands after you touch raw meat. Make sure any meat is well-cooked before you eat it. Avoid raw eggs and unpasteurized milk. Use gloves or ask someone else to clean your cat's litter box while you are pregnant. Changes happening with your baby: Your baby has fully formed fingernails and toenails. Your baby's heartbeat can now be heard. Ask your healthcare provider if you can listen to your baby's heartbeat. By week 14, your baby is over 4 inches long from the top of the head to the rump (baby's bottom). Your baby weighs over 3 ounces. Prenatal care:  Prenatal care is a series of visits with your healthcare provider throughout your pregnancy.  During the first 28 weeks of your pregnancy, you will see your healthcare provider 1 time each month. Prenatal care can help prevent problems during pregnancy and childbirth. Your healthcare provider will check your blood pressure and weight. Your baby's heart rate will also be checked. You may also need the following at some visits:  A pelvic exam  allows your healthcare provider to see your cervix (the bottom part of your uterus). Your healthcare provider will use a speculum to open your vagina. He or she will check the size and shape of your uterus. Blood tests  may be done to check for any of the following:    Gestational diabetes or anemia (low iron level)    Blood type or Rh factor, or certain birth defects    Immunity to certain diseases, such as chickenpox or rubella    An infection, such as a sexually transmitted infection, HIV, or hepatitis B    Hepatitis B  may need to be prevented or treated. Hepatitis B is inflammation of the liver caused by the hepatitis B virus (HBV). HBV can spread from a mother to her baby during delivery. You will be checked for HBV as early as possible in the first trimester of each pregnancy. You need the test even if you received the hepatitis B vaccine or were tested before. You may need to have an HBV infection treated before you give birth. Urine tests  may also be done to check for sugar and protein. These can be signs of gestational diabetes or preeclampsia. Urine tests may also be done to check for signs of infection. A fetal ultrasound  shows pictures of your baby inside your uterus. The pictures are used to check your baby's development, movement, and position. Genetic disorder screening tests  may be offered to you. These tests check your baby's risk for genetic disorders such as Down syndrome. A screening test includes a blood test and ultrasound. Follow up with your doctor or obstetrician as directed:  Go to all prenatal visits.  Write down your questions so you remember to ask them during your visits. © Copyright Israel Solorzano 2022 Information is for End User's use only and may not be sold, redistributed or otherwise used for commercial purposes. The above information is an  only. It is not intended as medical advice for individual conditions or treatments. Talk to your doctor, nurse or pharmacist before following any medical regimen to see if it is safe and effective for you. Round Ligament Pain   WHAT YOU NEED TO KNOW:   What is round ligament pain? Round ligament pain is caused when ligaments are stretched as your uterus (womb) gets bigger during pregnancy. Round ligaments are found on each side of your uterus. They are bands of tissue that hold the uterus in place. Round ligament pain happens most often during the second trimester. It is a normal part of pregnancy and should stop by the third trimester. The pain is not serious and will not hurt your baby. What are the signs and symptoms of round ligament pain? Pain on one or both sides of your lower abdomen or groin that may move up to your hip    Spasms in the muscles in your abdomen    Pain that lasts a few seconds    Pain that happens when you exercise, sneeze, change positions, or stand quickly    How is round ligament pain diagnosed? Your healthcare provider will examine you and ask about your pain. Tell the provider when the pain started, and if you feel it on one or both sides. Your provider may ask if anything helps the pain or makes it worse. What can I do to manage my pain? Round ligament pain does not need to be treated. The following may help make you more comfortable:  Rest as often as you can. Rest can help relieve round ligament pain. You might want to lie on the side that has pain. Place a pillow under your abdomen. Keep another pillow between your knees. Move slowly. Sudden movement can stretch the ligaments and cause pain. Stand, sit, and change positions slowly.  Try to tighten the muscles in your hips before you sneeze or laugh. You can also sit down and bring your knees up toward your abdomen. This can help relieve tension on the ligaments. Exercise as directed. Gentle exercise can keep the ligaments loose and strengthen core (abdominal) muscles. An example is swimming, or a yoga program designed for pregnancy. Ask your healthcare provider which exercises are safe for you and how often to exercise. For most healthy women, a good goal is to try to get at least 30 minutes of exercise every day. If activity causes pain, try not to walk too long or too far at one time. Break your exercise up into short amounts. Apply a warm compress to the area. Warmth can relieve pain and muscle spasms. Ask your healthcare provider if you can take a warm bath or use a heating pad. Keep all heat settings low. High heat can be dangerous for your baby. Do not sit in a hot tub or use hot water in your bath. You may also be able to massage the area gently while you are applying heat. Massage can help relieve pain. Ask about pain medicines. Ask your healthcare provider before you take any medicine during pregnancy, including over-the-counter pain medicines. Your healthcare provider may recommend acetaminophen to relieve the pain. Ask how much to take and how often to take it. Follow directions. Acetaminophen can cause liver damage. Too much medicine can be harmful to your baby. When should I contact my healthcare provider? You have pain that is spreading to other parts of your body. You have new or worsening pain. You have pain that lasts longer than a few minutes at a time. You have questions or concerns about your condition or care. CARE AGREEMENT:   You have the right to help plan your care. Learn about your health condition and how it may be treated. Discuss treatment options with your healthcare providers to decide what care you want to receive. You always have the right to refuse treatment.  The above information is an  only. It is not intended as medical advice for individual conditions or treatments. Talk to your doctor, nurse or pharmacist before following any medical regimen to see if it is safe and effective for you. © Copyright Kelin Shiakh 2022 Information is for End User's use only and may not be sold, redistributed or otherwise used for commercial purposes. Bell Villalobos Contractions   AMBULATORY CARE:   Mar Catching contractions  are tightening and squeezing of the muscles of your uterus (womb) during pregnancy. The uterine muscles control the uterus. Mendez Villalobos contractions stop on their own. They are not true labor contractions and do not cause your cervix (opening to your uterus) to dilate (open). Common symptoms include the following:   Pain or discomfort in your groin or lower abdomen that comes and goes    Your contractions are short, and do not last longer each time they happen    Your contractions do not get closer together each time    Your contractions do not get stronger or more painful each time    Your contractions stop when you change your position or rest    Seek care immediately if:   You have bleeding from your vagina. You have fluid leaking from your vagina that does not stop. You feel a gush of fluid from your vagina. Your contractions happen every 5 minutes or sooner, and last for more than 60 seconds. Your contractions begin to feel stronger or more painful. You feel a change in your baby's movement, or you feel fewer than 6 to 10 movements in an hour. Call your doctor or obstetrician if:   You have a fever. You have questions or concerns about your condition or care. Treatment for Bell Villalobos contractions  may include pain medicine to relieve discomfort or pain or sedatives to relax the muscles of your uterus. If you are dehydrated, he or she may give you fluids through an IV or tell you to drink liquids.   Self-care:   Change your activity or your position  when you feel contractions begin. Walk if you have been lying or sitting. Lie down if you have been standing or walking. True labor will not stop by changing your position or activity. Take a warm bath  to relax your body. Drink more liquids  to prevent dehydration. Ask how much liquid to drink each day and which liquids are best for you. Practice your labor breathing  to decrease your discomfort. This may help you get ready for true labor. Take slow, deep breaths, or fast, short breaths. Ask your healthcare provider how to practice labor breathing. Follow up with your doctor or obstetrician as directed:  Write down your questions so you remember to ask them during your visits. © Copyright Andrae Deleon 2022 Information is for End User's use only and may not be sold, redistributed or otherwise used for commercial purposes. The above information is an  only. It is not intended as medical advice for individual conditions or treatments. Talk to your doctor, nurse or pharmacist before following any medical regimen to see if it is safe and effective for you. [place] Villalobos Contractions   AMBULATORY CARE:   Mele [place] contractions  are tightening and squeezing of the muscles of your uterus (womb) during pregnancy. The uterine muscles control the uterus. [place] Villalobos contractions stop on their own. They are not true labor contractions and do not cause your cervix (opening to your uterus) to dilate (open). Common symptoms include the following:   Pain or discomfort in your groin or lower abdomen that comes and goes    Your contractions are short, and do not last longer each time they happen    Your contractions do not get closer together each time    Your contractions do not get stronger or more painful each time    Your contractions stop when you change your position or rest    Seek care immediately if:   You have bleeding from your vagina.     You have fluid leaking from your vagina that does not stop. You feel a gush of fluid from your vagina. Your contractions happen every 5 minutes or sooner, and last for more than 60 seconds. Your contractions begin to feel stronger or more painful. You feel a change in your baby's movement, or you feel fewer than 6 to 10 movements in an hour. Call your doctor or obstetrician if:   You have a fever. You have questions or concerns about your condition or care. Treatment for Mendez Villalobos contractions  may include pain medicine to relieve discomfort or pain or sedatives to relax the muscles of your uterus. If you are dehydrated, he or she may give you fluids through an IV or tell you to drink liquids. Self-care:   Change your activity or your position  when you feel contractions begin. Walk if you have been lying or sitting. Lie down if you have been standing or walking. True labor will not stop by changing your position or activity. Take a warm bath  to relax your body. Drink more liquids  to prevent dehydration. Ask how much liquid to drink each day and which liquids are best for you. Practice your labor breathing  to decrease your discomfort. This may help you get ready for true labor. Take slow, deep breaths, or fast, short breaths. Ask your healthcare provider how to practice labor breathing. Follow up with your doctor or obstetrician as directed:  Write down your questions so you remember to ask them during your visits. © Copyright Wolmarylou Jean Marie 2022 Information is for End User's use only and may not be sold, redistributed or otherwise used for commercial purposes. The above information is an  only. It is not intended as medical advice for individual conditions or treatments. Talk to your doctor, nurse or pharmacist before following any medical regimen to see if it is safe and effective for you.

## 2023-08-15 ENCOUNTER — INITIAL PRENATAL (OUTPATIENT)
Dept: OBGYN CLINIC | Facility: CLINIC | Age: 28
End: 2023-08-15

## 2023-08-15 VITALS — BODY MASS INDEX: 21 KG/M2 | HEIGHT: 64 IN | WEIGHT: 123 LBS

## 2023-08-15 DIAGNOSIS — Z34.81 PRENATAL CARE, SUBSEQUENT PREGNANCY, FIRST TRIMESTER: ICD-10-CM

## 2023-08-15 DIAGNOSIS — Z31.430 ENCOUNTER OF FEMALE FOR TESTING FOR GENETIC DISEASE CARRIER STATUS FOR PROCREATIVE MANAGEMENT: ICD-10-CM

## 2023-08-15 DIAGNOSIS — Z36.9 UNSPECIFIED ANTENATAL SCREENING: Primary | ICD-10-CM

## 2023-08-15 PROCEDURE — OBC: Performed by: CLINICAL NURSE SPECIALIST

## 2023-08-15 NOTE — PROGRESS NOTES
OB INTAKE INTERVIEW  Patient is 28 y.o.y.o. who presents for OB intake at 13wks  She is accompanied by FOB during this encounter  The father of her baby Tomasa Cook) is involved in the pregnancy and is 32years old.   Last Menstrual Period: 2023  Ultrasound: Measured 7 weeks 3days on 2023 by Xu Lin ( put labs under Mayme Newer in error)  Estimated Date of Delivery: 2024 changed by 7 week US    Signs/Symptoms of Pregnancy  Current pregnancy symptoms: none   Constipation no  Headaches no  Cramping/spotting no  PICA cravings no    Diabetes-  Body mass index is 21.11 kg/m². If patient has 1 or more, please order early 1 hour GTT  History of GDM no  BMI >35 no  History of PCOS or current metformin use no  History of LGA/macrosomic infant (4000g/9lbs) no    If patient has 2 or more, please order early 1 hour GTT  BMI>30 no  AMA no  First degree relative with type 2 diabetes no  History of chronic HTN, hyperlipidemia, elevated A1C no  High risk race (, , ,  or ) YES    Hypertension- if you answer yes, please order preeclampsia labs (cbc, comprehensive metabolic panel, urine protein creatinine ratio, uric acid)  History of of chronic HTN no  History of gestational HTN no  History of preeclampsia, eclampsia, or HELLP syndrome no  History of diabetes no  History of lupus, autoimmune disease, kidney disease no    Thyroid- if yes order TSH with reflex T4  History of thyroid disease no    Bleeding Disorder or Hx of DVT-patient or first degree relative with history of. Order the following if not done previously.    (Factor V, antithrombin III, prothrombin gene mutation, protein C and S Ag, lupus anticoagulant, anticardiolipin, beta-2 glycoprotein)   no    OB/GYN-  History of abnormal pap smear no       Date of last pap smear 2020  History of HPV no  History of Herpes/HSV no  History of other STI (gonorrhea, chlamydia, trich) no  History of prior  no  History of prior  no  History of  delivery prior to 36 weeks 6 days no  History of blood transfusion no  Ok for blood transfusion YES    Substance screening- if yes outside of tobacco for her or anyone in her home-order urine drug screen  History of tobacco use no  Currently using tobacco no  Currently using alcohol no  Presently using drugs no  Past drug use  no  IV drug use- no  Partner drug use no  Parent/Family drug use no    MRSA Screening-   Does the pt have a hx of MRSA? no  If yes- please follow MRSA protocol and obtain a nasal swab for MRSA culture    Immunizations:  Influenza vaccine given this season No  Discussed Tdap vaccine YES  Discussed COVID Vaccine YES    Genetic/MFM-  Do you or your partner have a history of any of the following in yourselves or first degree relatives? Cystic fibrosis no  Spinal muscular atrophy no  Hemoglobinopathy/Sickle Cell/Thalassemia no  Fragile X Intellectual Disability no    If yes, discuss carrier screening and recommend consultation with [facility]/genetic counseling. If no, discuss option for carrier screening and/or genetic testing with Nuchal Ultrasound.  Patient interested YES- had NT done  Appointment at [facility] made [address] [place] [address] Pregnancy Essentials Book reviewed, discussed and attached to their AVS YES    Nurse/Family Partnership- patient may qualify YES; referral placed NO    Prenatal lab work scripts YES  Extra labs ordered:  Carrier was dicussed with maternal fetal medicine  I placed the orders- they were interested in NIPS also      Aspirin/Preeclampsia Screen    Risk Level Risk Factor Recommendation   LOW Prior Uncomplicated full-term delivery no No Aspirin recommendation        MODERATE Nulliparity YES Recommend low-dose aspirin if     BMI>30 no 2 or more moderate risk factors    Family History Preeclampsia (mother/sister) no     35yr old or greater no      or Low Socioeconomic no     IVF Pregnancy  no Personal History Risks (low birth weight, prior adverse preg outcome, >10yr preg interval) no         HIGH History of Preeclampsia no Recommend low-dose aspirin if     Multifetal gestation no 1 or more high risk factors    Chronic HTN no     Type 1 or 2 Diabetes no     Renal Disease no     Autoimmune Disease  no      Contraindications to ASA therapy:  NSAID/ ASA allergy: no  Nasal polyps: no  Asthma with history of ASA induced bronchospasm: no  Relative contraindications:  History of GI bleed: no  Active peptic ulcer disease: no  Severe hepatic dysfunction: no    Patient should be recommended to take ASA 162mg during this pregnancy from 12-36wks to lower her risk of preeclampsia: NO      The patient has a history now or in prior pregnancy notable for:  none      Details that I feel the provider should be aware of: Nona Whittaker and Loretta Nichols are expecting their 1st baby! They are new with SLOGA. She is doing great just came [facility], resolved early issues. We reviewed a lot about genetic testing an tests offered. Nona Whittaker is going back to school for international relations and works at [employer] and Loretta Nichols works in [place] in [place]. PN1 visit scheduled. The patient was oriented to our practice, reviewed delivering physicians and [facility] for Delivery. All questions were answered.     Interviewed by: Harley Carreno MA

## 2023-08-15 NOTE — PATIENT INSTRUCTIONS
Congratulations!! Please review our Pregnancy Essential Guide and [employer] L&D Virtual tour from our [employer]. . [facility] Pregnancy Essentials Guide  [facility] ([address] [facility])     [id number] [facility] ([facility])

## 2023-08-16 ENCOUNTER — INITIAL PRENATAL (OUTPATIENT)
Dept: OBGYN CLINIC | Facility: CLINIC | Age: 28
End: 2023-08-16
Payer: COMMERCIAL

## 2023-08-16 ENCOUNTER — APPOINTMENT (OUTPATIENT)
Dept: LAB | Facility: HOSPITAL | Age: 28
End: 2023-08-16
Payer: COMMERCIAL

## 2023-08-16 VITALS
WEIGHT: 124 LBS | BODY MASS INDEX: 21.17 KG/M2 | SYSTOLIC BLOOD PRESSURE: 116 MMHG | HEIGHT: 64 IN | DIASTOLIC BLOOD PRESSURE: 70 MMHG

## 2023-08-16 DIAGNOSIS — Z34.01 ENCOUNTER FOR SUPERVISION OF NORMAL FIRST PREGNANCY IN FIRST TRIMESTER: Primary | ICD-10-CM

## 2023-08-16 DIAGNOSIS — Z11.3 SCREENING FOR STDS (SEXUALLY TRANSMITTED DISEASES): ICD-10-CM

## 2023-08-16 DIAGNOSIS — Z34.81 PRENATAL CARE, SUBSEQUENT PREGNANCY, FIRST TRIMESTER: ICD-10-CM

## 2023-08-16 LAB
ABO GROUP BLD: NORMAL
BACTERIA UR QL AUTO: NORMAL /HPF
BASOPHILS # BLD AUTO: 0.05 THOUSANDS/ÂΜL (ref 0–0.1)
BASOPHILS NFR BLD AUTO: 1 % (ref 0–1)
BILIRUB UR QL STRIP: NEGATIVE
BLD GP AB SCN SERPL QL: NEGATIVE
CLARITY UR: CLEAR
COLOR UR: COLORLESS
EOSINOPHIL # BLD AUTO: 0.16 THOUSAND/ÂΜL (ref 0–0.61)
EOSINOPHIL NFR BLD AUTO: 2 % (ref 0–6)
ERYTHROCYTE [DISTWIDTH] IN BLOOD BY AUTOMATED COUNT: 12.2 % (ref 11.6–15.1)
GLUCOSE UR STRIP-MCNC: NEGATIVE MG/DL
HBV SURFACE AG SER QL: NORMAL
HCT VFR BLD AUTO: 34.6 % (ref 34.8–46.1)
HCV AB SER QL: NORMAL
HGB BLD-MCNC: 12.1 G/DL (ref 11.5–15.4)
HGB UR QL STRIP.AUTO: NEGATIVE
HIV 1+2 AB+HIV1 P24 AG SERPL QL IA: NORMAL
HIV 2 AB SERPL QL IA: NORMAL
HIV1 AB SERPL QL IA: NORMAL
HIV1 P24 AG SERPL QL IA: NORMAL
IMM GRANULOCYTES # BLD AUTO: 0.03 THOUSAND/UL (ref 0–0.2)
IMM GRANULOCYTES NFR BLD AUTO: 0 % (ref 0–2)
KETONES UR STRIP-MCNC: NEGATIVE MG/DL
LEUKOCYTE ESTERASE UR QL STRIP: NEGATIVE
LYMPHOCYTES # BLD AUTO: 1.71 THOUSANDS/ÂΜL (ref 0.6–4.47)
LYMPHOCYTES NFR BLD AUTO: 18 % (ref 14–44)
MCH RBC QN AUTO: 31.2 PG (ref 26.8–34.3)
MCHC RBC AUTO-ENTMCNC: 35 G/DL (ref 31.4–37.4)
MCV RBC AUTO: 89 FL (ref 82–98)
MONOCYTES # BLD AUTO: 0.56 THOUSAND/ÂΜL (ref 0.17–1.22)
MONOCYTES NFR BLD AUTO: 6 % (ref 4–12)
NEUTROPHILS # BLD AUTO: 7.01 THOUSANDS/ÂΜL (ref 1.85–7.62)
NEUTS SEG NFR BLD AUTO: 73 % (ref 43–75)
NITRITE UR QL STRIP: NEGATIVE
NON-SQ EPI CELLS URNS QL MICRO: NORMAL /HPF
NRBC BLD AUTO-RTO: 0 /100 WBCS
PH UR STRIP.AUTO: 7 [PH]
PLATELET # BLD AUTO: 329 THOUSANDS/UL (ref 149–390)
PMV BLD AUTO: 8.3 FL (ref 8.9–12.7)
PROT UR STRIP-MCNC: NEGATIVE MG/DL
RBC # BLD AUTO: 3.88 MILLION/UL (ref 3.81–5.12)
RBC #/AREA URNS AUTO: NORMAL /HPF
RH BLD: POSITIVE
RUBV IGG SERPL IA-ACNC: 93.2 IU/ML
SL AMB  POCT GLUCOSE, UA: NORMAL
SL AMB POCT URINE PROTEIN: NORMAL
SP GR UR STRIP.AUTO: 1 (ref 1–1.03)
SPECIMEN EXPIRATION DATE: NORMAL
TREPONEMA PALLIDUM IGG+IGM AB [PRESENCE] IN SERUM OR PLASMA BY IMMUNOASSAY: NORMAL
UROBILINOGEN UR STRIP-ACNC: <2 MG/DL
WBC # BLD AUTO: 9.52 THOUSAND/UL (ref 4.31–10.16)
WBC #/AREA URNS AUTO: NORMAL /HPF

## 2023-08-16 PROCEDURE — 87340 HEPATITIS B SURFACE AG IA: CPT

## 2023-08-16 PROCEDURE — 87491 CHLMYD TRACH DNA AMP PROBE: CPT | Performed by: OBSTETRICS & GYNECOLOGY

## 2023-08-16 PROCEDURE — 86762 RUBELLA ANTIBODY: CPT

## 2023-08-16 PROCEDURE — PNV: Performed by: OBSTETRICS & GYNECOLOGY

## 2023-08-16 PROCEDURE — 86803 HEPATITIS C AB TEST: CPT

## 2023-08-16 PROCEDURE — 86850 RBC ANTIBODY SCREEN: CPT

## 2023-08-16 PROCEDURE — 85025 COMPLETE CBC W/AUTO DIFF WBC: CPT

## 2023-08-16 PROCEDURE — 86901 BLOOD TYPING SEROLOGIC RH(D): CPT

## 2023-08-16 PROCEDURE — G0145 SCR C/V CYTO,THINLAYER,RESCR: HCPCS | Performed by: OBSTETRICS & GYNECOLOGY

## 2023-08-16 PROCEDURE — 86900 BLOOD TYPING SEROLOGIC ABO: CPT

## 2023-08-16 PROCEDURE — 87086 URINE CULTURE/COLONY COUNT: CPT

## 2023-08-16 PROCEDURE — 81001 URINALYSIS AUTO W/SCOPE: CPT

## 2023-08-16 PROCEDURE — 86780 TREPONEMA PALLIDUM: CPT

## 2023-08-16 PROCEDURE — 36415 COLL VENOUS BLD VENIPUNCTURE: CPT

## 2023-08-16 PROCEDURE — 87389 HIV-1 AG W/HIV-1&-2 AB AG IA: CPT

## 2023-08-16 PROCEDURE — 81002 URINALYSIS NONAUTO W/O SCOPE: CPT | Performed by: OBSTETRICS & GYNECOLOGY

## 2023-08-16 PROCEDURE — 87591 N.GONORRHOEAE DNA AMP PROB: CPT | Performed by: OBSTETRICS & GYNECOLOGY

## 2023-08-16 PROCEDURE — 87147 CULTURE TYPE IMMUNOLOGIC: CPT

## 2023-08-16 NOTE — PROGRESS NOTES
13w3d  Pap Not on file. GC/CT:  PN1 Labs:   Blood Type:       Rhogam:   MFM Level 1: 8/10/2023  MFM Level 2: schedule for 10/3/2023  AFP:   28 Week Labs:  TDap:  Flu:  GBS:   Blue Folder: Given at today's visit and reviewed with patient .   Yellow Folder:  Ped Referral :  Breast pump:  L&D forms:  Delivery consent:

## 2023-08-16 NOTE — PROGRESS NOTES
Problem   12 Weeks Gestation of Pregnancy    Blood Type: Summer Renteria Rhogam:   Pap      . GC/CT   PN1 Labs:     H&H:   28 Week Labs:  AFP:  Level 1:completed   Level 2:  Tdap:  Flu:   GBS swab:     Blue folder: give   Yellow folder:     Breast pump order:  L&D forms:    Delivery consent:   IOL:            12 weeks gestation of pregnancy  PN1Epifanio Ramirez is a 33y.o. year old  at 13w3d who presents for Initial prenatal visit. Planned pregnancy  Already had first appt with MFM, NIPS testing completed     Works at [employer], in [employer], does heavy lifting at times   Serina Mian works in printing   1 dog in the home    Denies complaints. Denies pelvic pain, bleeding, LOF. They just got back from vacation in [facility], she did have some diarrhea but that has resolved   Exam WNL. Prenatal labs Not completed, OB intake was today. Pap& GC/CT sent today. Patient Education: Patient was counseled regarding diet, exercise, weight gain, foods to avoid, vaccines in pregnancy, aneuploidy screening, travel precautions to include seat belt use and VTE risk reduction. She has been provided our pregnancy packet which includes pregnancy warning signs,how and when to contact providers, visit intervals, Maternal fetal medicine information, medication recommendations that are safe during pregnancy, dietary suggestions, activity recommendations, breastfeeding information as well as websites for additional information, and delivery location. No past medical history on file. Past Surgical History:   Procedure Laterality Date   • LASIK Bilateral        There is no immunization history on file for this patient.       Family History   Problem Relation Age of Onset   • No Known Problems Mother    • No Known Problems Father    • No Known Problems Brother    • Osteoarthritis Maternal Grandmother    • Diabetes Maternal Grandfather    • Hypertension Maternal Grandfather    • Diabetes Paternal Grandfather      Social History     Tobacco Use   • Smoking status: Never   • Smokeless tobacco: Never   Vaping Use   • Vaping Use: Former   • Substances: THC   Substance Use Topics   • Alcohol use: Not Currently   • Drug use: Never       Current Outpatient Medications:   •  ciclopirox (PENLAC) 8 % solution, Apply topically daily at bedtime (Patient not taking: Reported on 8/15/2023), Disp: 6.6 mL, Rfl: 0  •  Prenatal Vit-Fe Fumarate-FA (PRENATAL VITAMIN PO), Take by mouth, Disp: , Rfl:   Patient Active Problem List    Diagnosis Date Noted   • 12 weeks gestation of pregnancy 08/10/2023   • Vaginal infection 2023   • Bacterial vaginosis 2023       No Known Allergies    OB History    Para Term  AB Living   2       1     SAB IAB Ectopic Multiple Live Births                  # Outcome Date GA Lbr Akira/2nd Weight Sex Delivery Anes PTL Lv   2 Current            1 AB                Vitals:    23 1443   BP: 116/70   BP Location: Right arm   Patient Position: Sitting   Cuff Size: Large   Weight: 56.2 kg (124 lb)   Height: 5' 4" (1.626 m)     Body mass index is 21.28 kg/m².

## 2023-08-18 PROBLEM — R82.71 GBS BACTERIURIA: Status: ACTIVE | Noted: 2023-08-18

## 2023-08-18 LAB
BACTERIA UR CULT: ABNORMAL
C TRACH DNA SPEC QL NAA+PROBE: NEGATIVE
N GONORRHOEA DNA SPEC QL NAA+PROBE: NEGATIVE

## 2023-08-21 LAB
LAB AP GYN PRIMARY INTERPRETATION: NORMAL
Lab: NORMAL
PATH INTERP SPEC-IMP: NORMAL

## 2023-09-05 ENCOUNTER — APPOINTMENT (OUTPATIENT)
Dept: LAB | Facility: HOSPITAL | Age: 28
End: 2023-09-05
Payer: COMMERCIAL

## 2023-09-05 ENCOUNTER — NURSE TRIAGE (OUTPATIENT)
Dept: OTHER | Facility: OTHER | Age: 28
End: 2023-09-05

## 2023-09-05 DIAGNOSIS — Z36.9 UNSPECIFIED ANTENATAL SCREENING: ICD-10-CM

## 2023-09-05 DIAGNOSIS — Z31.430 ENCOUNTER OF FEMALE FOR TESTING FOR GENETIC DISEASE CARRIER STATUS FOR PROCREATIVE MANAGEMENT: ICD-10-CM

## 2023-09-05 PROCEDURE — 81329 SMN1 GENE DOS/DELETION ALYS: CPT

## 2023-09-05 PROCEDURE — 81220 CFTR GENE COM VARIANTS: CPT

## 2023-09-05 PROCEDURE — 36415 COLL VENOUS BLD VENIPUNCTURE: CPT

## 2023-09-05 NOTE — TELEPHONE ENCOUNTER
Reason for Disposition  • SPOTTING after sexual intercourse (single or brief episode)    Answer Assessment - Initial Assessment Questions  1. ONSET: "When did this bleeding start?"        Light spotting on Sunday. Then nothing then one episode today. 2. DESCRIPTION: "Describe the bleeding that you are having." "How much bleeding is there?"     - SPOTTING: spotting, or pinkish / brownish mucous discharge; does not fill panti-liner or pad     - MILD:  less than 1 pad / hour; less than patient's usual menstrual bleeding    - MODERATE: 1-2 pads / hour; 1 menstrual cup every 6 hours; small-medium blood clots (e.g., pea, grape, small coin)    - SEVERE: soaking 2 or more pads/hour for 2 or more hours; 1 menstrual cup every 2 hours; bleeding not contained by pads or continuous red blood from vagina; large blood clots (e.g., golf ball, large coin)       Spotting  3. ABDOMINAL PAIN SEVERITY: If present, ask: "How bad is it?"  (e.g., Scale 1-10; mild, moderate, or severe)    - MILD (1-3): doesn't interfere with normal activities, abdomen soft and not tender to touch     - MODERATE (4-7): interferes with normal activities or awakens from sleep, tender to touch     - SEVERE (8-10): excruciating pain, doubled over, unable to do any normal activities      No  4. PREGNANCY: "Do you know how many weeks or months pregnant you are?" "When was the first day of your last normal menstrual period?"      16 weeks 2 days  5. HEMODYNAMIC STATUS: "Are you weak or feeling lightheaded?" If Yes, ask: "Can you stand and walk normally?"       No  6. OTHER SYMPTOMS: "What other symptoms are you having with the bleeding?" (e.g., passed tissue, vaginal discharge, fever, menstrual-type cramps)        Had sexual intercourse on Saturday    Protocols used: PREGNANCY - VAGINAL BLEEDING LESS THAN 20 WEEKS EGA-ADULT-    Pt asking if she should avoid sexual intercourse. Per protocol, should avoid sexual intercourse until seen by OB.  Please f/u with her tomorrow and see how she is doing as well as let her know if there are any restrictions.

## 2023-09-05 NOTE — TELEPHONE ENCOUNTER
Regarding: 15 weeks pregnant / light pink discharge  ----- Message from Rafael Garcia sent at 9/5/2023  7:52 PM EDT -----  "I am 15 weeks pregnant.  I have a light pink discharge."

## 2023-09-11 LAB
CF COMMENT: NORMAL
CFTR MUT ANL BLD/T: NORMAL
CITATION REF LAB TEST: NORMAL
CLINICAL INFO: NORMAL
ETHNIC BACKGROUND STATED: NORMAL
GENE DIS ANL CARRIER INTERP-IMP: NORMAL
GENE MUT TESTED BLD/T: NORMAL
LAB DIRECTOR NAME PROVIDER: NORMAL
REASON FOR REFERRAL (NARRATIVE): NORMAL
RECOMMENDATION PATIENT DOC-IMP: NORMAL
REF LAB TEST METHOD: NORMAL
SERVICE CMNT-IMP: NORMAL
SMN1 GENE MUT ANL BLD/T: NORMAL
SPECIMEN SOURCE: NORMAL

## 2023-09-12 ENCOUNTER — ROUTINE PRENATAL (OUTPATIENT)
Dept: OBGYN CLINIC | Facility: MEDICAL CENTER | Age: 28
End: 2023-09-12

## 2023-09-12 VITALS — BODY MASS INDEX: 22.04 KG/M2 | WEIGHT: 128.4 LBS

## 2023-09-12 DIAGNOSIS — Z34.82 PRENATAL CARE, SUBSEQUENT PREGNANCY, SECOND TRIMESTER: Primary | ICD-10-CM

## 2023-09-12 DIAGNOSIS — Z3A.16 16 WEEKS GESTATION OF PREGNANCY: ICD-10-CM

## 2023-09-12 DIAGNOSIS — Z36.9 ENCOUNTER FOR ANTENATAL SCREENING: ICD-10-CM

## 2023-09-12 DIAGNOSIS — Z33.1 INCIDENTAL PREGNANCY: ICD-10-CM

## 2023-09-12 DIAGNOSIS — O99.342 ANXIETY IN PREGNANCY IN SECOND TRIMESTER, ANTEPARTUM: ICD-10-CM

## 2023-09-12 DIAGNOSIS — F41.9 ANXIETY IN PREGNANCY IN SECOND TRIMESTER, ANTEPARTUM: ICD-10-CM

## 2023-09-12 PROBLEM — B96.89 BACTERIAL VAGINOSIS: Status: RESOLVED | Noted: 2023-01-24 | Resolved: 2023-09-12

## 2023-09-12 PROBLEM — N76.0 VAGINAL INFECTION: Status: RESOLVED | Noted: 2023-01-24 | Resolved: 2023-09-12

## 2023-09-12 PROBLEM — N76.0 BACTERIAL VAGINOSIS: Status: RESOLVED | Noted: 2023-01-24 | Resolved: 2023-09-12

## 2023-09-12 LAB
SL AMB  POCT GLUCOSE, UA: NORMAL
SL AMB POCT URINE PROTEIN: NORMAL

## 2023-09-12 NOTE — PROGRESS NOTES
Problem List Items Addressed This Visit        Other    16 weeks gestation of pregnancy     Ronald Burks  is a 33 y.o. Ma Keena @17w2d who presents for routine prenatal visit. Denies OB complaints. NIPT - low risk  AFP - ordered   Level II - scheduled     Has not yet appreciated fetal movement. Denies contractions, cramping, leakage of fluid or vaginal bleeding. Reviewed PTL precautions and reasons to call. Anxiety in pregnancy in second trimester, antepartum     Heightened anxiety over the past week. Mostly at bedtime. Unsure of triggers. Denies hx of anxiety or depression. No depression sx currently. Denies SI/HI. Reveiwed coping measures. Referral to baby and me placed, she will consider counseling. Does not feel medication is necessary at this time, but will report back with changes or worsening. Supported by  who accompanies her today.           Relevant Orders    Ambulatory referral to Psych Services   Other Visit Diagnoses     Prenatal care, subsequent pregnancy, second trimester    -  Primary    Relevant Orders    POCT urine dip (Completed)    Incidental pregnancy        Relevant Orders    Alpha fetoprotein, maternal    Encounter for  screening        Relevant Orders    Alpha fetoprotein, maternal

## 2023-09-12 NOTE — PROGRESS NOTES
Patient here for prenatal visit. She states she is having a lot of anxiety & some cramping; denies spotting. She is not feeling movement yet. Level II US scheduled 10/3/23. Order for AFP screen given today. Urine neg for protein and glucose.

## 2023-09-12 NOTE — ASSESSMENT & PLAN NOTE
Heightened anxiety over the past week. Mostly at bedtime. Unsure of triggers. Denies hx of anxiety or depression. No depression sx currently. Denies SI/HI. Reveiwed coping measures. Referral to baby and me placed, she will consider counseling. Does not feel medication is necessary at this time, but will report back with changes or worsening. Supported by  who accompanies her today.

## 2023-09-12 NOTE — ASSESSMENT & PLAN NOTE
Berenice Espinosa  is a 33 y.o.  @17w2d who presents for routine prenatal visit. Denies OB complaints. NIPT - low risk  AFP - ordered   Level II - scheduled     Has not yet appreciated fetal movement. Denies contractions, cramping, leakage of fluid or vaginal bleeding. Reviewed PTL precautions and reasons to call.

## 2023-09-16 ENCOUNTER — APPOINTMENT (OUTPATIENT)
Dept: LAB | Facility: HOSPITAL | Age: 28
End: 2023-09-16
Payer: COMMERCIAL

## 2023-09-16 DIAGNOSIS — Z36.9 ENCOUNTER FOR ANTENATAL SCREENING: ICD-10-CM

## 2023-09-16 DIAGNOSIS — Z33.1 INCIDENTAL PREGNANCY: ICD-10-CM

## 2023-09-16 PROCEDURE — 36415 COLL VENOUS BLD VENIPUNCTURE: CPT

## 2023-09-16 PROCEDURE — 82105 ALPHA-FETOPROTEIN SERUM: CPT

## 2023-09-20 LAB
2ND TRIMESTER 4 SCREEN SERPL-IMP: NORMAL
AFP ADJ MOM SERPL: 0.95
AFP INTERP AMN-IMP: NORMAL
AFP INTERP SERPL-IMP: NORMAL
AFP INTERP SERPL-IMP: NORMAL
AFP SERPL-MCNC: 45.9 NG/ML
AGE AT DELIVERY: 30.1 YR
GA METHOD: NORMAL
GA: 17.9 WEEKS
IDDM PATIENT QL: NO
MULTIPLE PREGNANCY: NO
NEURAL TUBE DEFECT RISK FETUS: NORMAL %

## 2023-10-03 ENCOUNTER — ROUTINE PRENATAL (OUTPATIENT)
Dept: PERINATAL CARE | Facility: OTHER | Age: 28
End: 2023-10-03
Payer: COMMERCIAL

## 2023-10-03 VITALS
HEART RATE: 99 BPM | WEIGHT: 135.4 LBS | SYSTOLIC BLOOD PRESSURE: 100 MMHG | BODY MASS INDEX: 23.12 KG/M2 | DIASTOLIC BLOOD PRESSURE: 58 MMHG | HEIGHT: 64 IN

## 2023-10-03 DIAGNOSIS — O99.342 ANXIETY IN PREGNANCY IN SECOND TRIMESTER, ANTEPARTUM: ICD-10-CM

## 2023-10-03 DIAGNOSIS — Z3A.20 20 WEEKS GESTATION OF PREGNANCY: Primary | ICD-10-CM

## 2023-10-03 DIAGNOSIS — F41.9 ANXIETY IN PREGNANCY IN SECOND TRIMESTER, ANTEPARTUM: ICD-10-CM

## 2023-10-03 PROCEDURE — 76805 OB US >/= 14 WKS SNGL FETUS: CPT | Performed by: OBSTETRICS & GYNECOLOGY

## 2023-10-03 PROCEDURE — 76817 TRANSVAGINAL US OBSTETRIC: CPT | Performed by: OBSTETRICS & GYNECOLOGY

## 2023-10-03 PROCEDURE — 99213 OFFICE O/P EST LOW 20 MIN: CPT | Performed by: OBSTETRICS & GYNECOLOGY

## 2023-10-03 NOTE — PROGRESS NOTES
A fetal ultrasound was completed. See Ob procedures in Epic for an interpretation and recommendations. Do not hesitate to contact us in [facility] if you have questions. Danielito Ellis MD, [address] [facility]  Maternal Fetal Medicine
Ultrasound Probe Disinfection    A transvaginal ultrasound was performed. Prior to use, disinfection was performed with High Level Disinfection Process ([employer]). Probe serial number M1: [id number]   was used.       Victorino Cedillo  10/03/23  12:57 PM
c/o NBNB vomiting and decreased PO x2 days. abd soft and nontender. denies fevers.

## 2023-10-03 NOTE — LETTER
October 3, 2023     Devan Mcpherson PA-C  [address] [place]    Patient: Junior Brown   YOB: 1995   Date of Visit: 10/3/2023       Dear Ms Say Roberts: Thank you for referring Jessie Benjamin to me for evaluation. Below are my notes for this consultation. If you have questions, please do not hesitate to call me. I look forward to following your patient along with you. Sincerely,        Ellis Dangelo MD        CC: No Recipients    Ellis Dangelo MD  10/3/2023  3:56 PM  Sign when Signing Visit  A fetal ultrasound was completed. See Ob procedures in Epic for an interpretation and recommendations. Do not hesitate to contact us in [facility] if you have questions. Cheryl Sanchez MD, [address] [facility]  Maternal Fetal Medicine

## 2023-10-06 ENCOUNTER — OFFICE VISIT (OUTPATIENT)
Dept: FAMILY MEDICINE CLINIC | Facility: CLINIC | Age: 28
End: 2023-10-06
Payer: COMMERCIAL

## 2023-10-06 VITALS
HEART RATE: 91 BPM | OXYGEN SATURATION: 98 % | TEMPERATURE: 97.6 F | DIASTOLIC BLOOD PRESSURE: 60 MMHG | HEIGHT: 64 IN | WEIGHT: 134.5 LBS | SYSTOLIC BLOOD PRESSURE: 100 MMHG | BODY MASS INDEX: 22.96 KG/M2

## 2023-10-06 DIAGNOSIS — J01.10 ACUTE NON-RECURRENT FRONTAL SINUSITIS: Primary | ICD-10-CM

## 2023-10-06 PROBLEM — Z3A.21 21 WEEKS GESTATION OF PREGNANCY: Status: ACTIVE | Noted: 2023-08-10

## 2023-10-06 PROCEDURE — 99213 OFFICE O/P EST LOW 20 MIN: CPT | Performed by: NURSE PRACTITIONER

## 2023-10-06 NOTE — PROGRESS NOTES
Name: Brandi Lemus      : 1995      MRN: [id number]  Encounter Provider: DON Diaz  Encounter Date: 10/6/2023   Encounter department: [facility] [address] [facility]     1. Acute non-recurrent frontal sinusitis  Assessment & Plan:  She has sinus pain, pressure, ear pain. Discussed viral versus bacterial.  Patient is 21 weeks pregnant. Discussed nonpharmacological inventions. May use steam to help decongest increase fluid hydration tea with honey, soup. If no improvement or cough becomes productive will evaluate the need for antibiotics. Subjective      Is being seen with complaints of sinus pain, pressure, congestion. Symptoms started 2 days ago. Denies any fevers or chills. Patient is 21 weeks pregnant    Cough  This is a new problem. The current episode started yesterday. The problem has been gradually worsening. The problem occurs every few minutes. The cough is productive of sputum. Associated symptoms include ear congestion and nasal congestion. Pertinent negatives include no chest pain, chills, ear pain, fever, headaches, heartburn, hemoptysis, myalgias, postnasal drip, rash, rhinorrhea, sore throat, shortness of breath, sweats, weight loss or wheezing. The symptoms are aggravated by lying down. Review of Systems   Constitutional: Negative for chills, fever and weight loss. HENT: Negative for ear pain, postnasal drip, rhinorrhea and sore throat. Respiratory: Positive for cough. Negative for hemoptysis, shortness of breath and wheezing. Cardiovascular: Negative for chest pain. Gastrointestinal: Negative for heartburn. Musculoskeletal: Negative for myalgias. Skin: Negative for rash. Neurological: Negative for headaches.        Current Outpatient Medications on File Prior to Visit   Medication Sig   • Prenatal Vit-Fe Fumarate-FA (PRENATAL VITAMIN PO) Take by mouth   • ciclopirox (PENLAC) 8 % solution Apply topically daily at bedtime (Patient not taking: Reported on 8/15/2023)       Objective     /60 (BP Location: Left arm, Patient Position: Sitting, Cuff Size: Adult)   Pulse 91   Temp 97.6 °F (36.4 °C) (Temporal)   Ht 5' 4" (1.626 m)   Wt 61 kg (134 lb 8 oz)   LMP 05/02/2023   SpO2 98%   BMI 23.09 kg/m²     Physical Exam  Vitals and nursing note reviewed. Constitutional:       Appearance: Normal appearance. She is well-developed. HENT:      Head: Normocephalic and atraumatic. Right Ear: External ear normal.      Left Ear: External ear normal.      Nose:      Right Sinus: Frontal sinus tenderness present. Left Sinus: Frontal sinus tenderness present. Eyes:      General: Allergic shiner present. Cardiovascular:      Rate and Rhythm: Normal rate and regular rhythm. Pulses: Normal pulses. Heart sounds: Normal heart sounds. Pulmonary:      Effort: Pulmonary effort is normal.      Breath sounds: Normal breath sounds. No wheezing or rhonchi. Musculoskeletal:         General: Normal range of motion. Cervical back: Normal range of motion. Skin:     General: Skin is warm and dry. Neurological:      Mental Status: She is alert and oriented to person, place, and time. Psychiatric:         Mood and Affect: Mood normal.         Behavior: Behavior normal.         Thought Content:  Thought content normal.         Judgment: Judgment normal.       DON Perez

## 2023-10-06 NOTE — ASSESSMENT & PLAN NOTE
She has sinus pain, pressure, ear pain. Discussed viral versus bacterial.  Patient is 21 weeks pregnant. Discussed nonpharmacological inventions. May use steam to help decongest increase fluid hydration tea with honey, soup. If no improvement or cough becomes productive will evaluate the need for antibiotics.

## 2023-10-09 ENCOUNTER — TELEPHONE (OUTPATIENT)
Dept: OBGYN CLINIC | Facility: CLINIC | Age: 28
End: 2023-10-09

## 2023-10-09 NOTE — TELEPHONE ENCOUNTER
Spoke to patient. Brief episode of Blurry vision in right eye. Started today started about 4pm. Little dot of light. Drank some water right away and it is gone. Feels completely normal.   No hx of migraines or HA's and none currently. BP is good. Advised to be sure she stays hydrated. If reoccurs let us know right away.

## 2023-10-11 NOTE — PROGRESS NOTES
21w3d  Pap 2023. Negative   GC/CT:2023 Neg / Neg   PN1 Labs: 2023  Blood Type: O  Positive  :   MFM Level 1: 8/10/2023  MFM Level 2: 10/3/2023  AFP: 2023  28 Week Labs:  TDap:  Flu:   GBS:   Blue Folder: given   Yellow Folder:  Ped Referral :  Breast pump:  L&D forms:  Delivery consent:     Patient reports positive fetal movements with no concerns .

## 2023-10-13 ENCOUNTER — ROUTINE PRENATAL (OUTPATIENT)
Dept: OBGYN CLINIC | Facility: CLINIC | Age: 28
End: 2023-10-13
Payer: COMMERCIAL

## 2023-10-13 VITALS
SYSTOLIC BLOOD PRESSURE: 118 MMHG | HEIGHT: 64 IN | BODY MASS INDEX: 23.12 KG/M2 | DIASTOLIC BLOOD PRESSURE: 70 MMHG | WEIGHT: 135.4 LBS

## 2023-10-13 DIAGNOSIS — Z34.02 ENCOUNTER FOR SUPERVISION OF NORMAL FIRST PREGNANCY IN SECOND TRIMESTER: Primary | ICD-10-CM

## 2023-10-13 LAB
SL AMB  POCT GLUCOSE, UA: NORMAL
SL AMB POCT URINE PROTEIN: NORMAL

## 2023-10-13 PROCEDURE — 81002 URINALYSIS NONAUTO W/O SCOPE: CPT | Performed by: OBSTETRICS & GYNECOLOGY

## 2023-10-13 PROCEDURE — PNV: Performed by: OBSTETRICS & GYNECOLOGY

## 2023-10-13 NOTE — PROGRESS NOTES
Problem   21 Weeks Gestation of Pregnancy    Blood Type: O+      Pap  Neg    . GC/CT  Neg  PN1 Labs:     H&H: 12.1/34.6  28 Week Labs:  AFP: Neg   Level 1:completed   Level 2:  Tdap:  Flu:   GBS swab:     Blue folder: give   Yellow folder:     Breast pump order:  L&D forms:    Delivery consent:   IOL:            21 weeks gestation of pregnancy  Danielle Meraz is a 33 y.o.  21w5d  TWG 6.078 kg (13 lb 6.4 oz)  Feels well. Denies LOF/CTX/VB. No concerns. AFP completed Neg  Vaccines discussed   Flu vaccine declined today , will decide for next visit    labor precautions reviewed. Encouraged adequate hydration and nutrition  Pregnancy Essential guide and [facility] web site recommended.

## 2023-10-13 NOTE — PATIENT INSTRUCTIONS
Pregnancy at 23 to 26 Weeks   AMBULATORY CARE:   What changes are happening to your body: You are now close to or at the beginning of the third trimester. The third trimester starts at 24 weeks and ends with delivery. As your baby gets larger, you may develop certain symptoms. These may include pain in your back or down the sides of your abdomen. You may also have stretch marks on your abdomen, breasts, thighs, or buttocks. You may also have constipation. Seek care immediately if:   You develop a severe headache that does not go away. You have new or increased vision changes, such as blurred or spotted vision. You have new or increased swelling in your face or hands. You have vaginal spotting or bleeding. Your water broke or you feel warm water gushing or trickling from your vagina. Call your doctor or obstetrician if:   You have abdominal cramps, pressure, or tightening. You have a change in vaginal discharge. You have light bleeding. You have chills or a fever. You have vaginal itching, burning, or pain. You have yellow, green, white, or foul-smelling vaginal discharge. You have pain or burning when you urinate, less urine than usual, or pink or bloody urine. You have questions or concerns about your condition or care. How to care for yourself at this stage of your pregnancy:       Eat a variety of healthy foods. Healthy foods include fruits, vegetables, whole-grain breads, low-fat dairy foods, beans, lean meats, and fish. Drink liquids as directed. Ask how much liquid to drink each day and which liquids are best for you. Limit caffeine to less than 200 milligrams each day. Limit your intake of fish to 2 servings each week. Choose fish low in mercury such as canned light tuna, shrimp, salmon, cod, or tilapia. Do not  eat fish high in mercury such as swordfish, tilefish, bella mackerel, and shark. Manage back pain.   Do not stand for long periods of time or lift heavy items. Use good posture while you stand, squat, or bend. Wear low-heeled shoes with good support. Rest may also help to relieve back pain. Ask your healthcare provider about exercises you can do to strengthen your back muscles. Take prenatal vitamins as directed. Your need for certain vitamins and minerals, such as folic acid, increases during pregnancy. Prenatal vitamins provide some of the extra vitamins and minerals you need. Prenatal vitamins may also help to decrease the risk of certain birth defects. Talk to your healthcare provider about exercise. Moderate exercise can help you stay fit. Your healthcare provider will help you plan an exercise program that is safe for you during pregnancy. Do not smoke. Smoking increases your risk of a miscarriage and other health problems during your pregnancy. Smoking can cause your baby to be born too early or weigh less at birth. Ask your healthcare provider for information if you need help quitting. Do not drink alcohol. Alcohol passes from your body to your baby through the placenta. It can affect your baby's brain development and cause fetal alcohol syndrome (FAS). FAS is a group of conditions that causes mental, behavior, and growth problems. Talk to your healthcare provider before you take any medicines. Many medicines may harm your baby if you take them when you are pregnant. Do not take any medicines, vitamins, herbs, or supplements without first talking to your healthcare provider. Never use illegal or street drugs (such as marijuana or cocaine) while you are pregnant. Safety tips during pregnancy:   Avoid hot tubs and saunas. Do not use a hot tub or sauna while you are pregnant, especially during your first trimester. Hot tubs and saunas may raise your baby's temperature and increase the risk of birth defects. Avoid toxoplasmosis. This is an infection caused by eating raw meat or being around infected cat feces.  It can cause birth defects, miscarriages, and other problems. Wash your hands after you touch raw meat. Make sure any meat is well-cooked before you eat it. Avoid raw eggs and unpasteurized milk. Use gloves or ask someone else to clean your cat's litter box while you are pregnant. Changes that are happening with your baby:  By 26 weeks, your baby will weigh about 2 pounds. Your baby will be about 10 inches long from the top of the head to the rump (baby's bottom). Your baby's movements are much stronger now. Your baby's eyes are almost completely formed and can partially open. Your baby also sleeps and wakes up. What you need to know about prenatal care: Your healthcare provider will check your blood pressure and weight. You may also need the following:  A urine test  may also be done to check for sugar and protein. These can be signs of gestational diabetes or infection. Protein in your urine may also be a sign of preeclampsia. Preeclampsia is a condition that can develop during week 20 or later of your pregnancy. It causes high blood pressure, and it can cause problems with your kidneys and other organs. A gestational diabetes screen  may be done. Your healthcare provider may order either a 1-step or 2-step oral glucose tolerance test (OGTT). 1-step OGTT:  Your blood sugar level will be tested after you have not eaten for 8 hours (fasting). You will then be given a glucose drink. Your level will be tested again 1 hour and 2 hours after you finish the drink. 2-step OGTT:  You do not have to fast for the first part of the test. You will have the glucose drink at any time of day. Your blood sugar level will be checked 1 hour later. If your blood sugar is higher than a certain level, another test will be ordered. You will fast and your blood sugar level will be tested. You will have the glucose drink. Your blood will be tested again 1 hour, 2 hours, and 3 hours after you finish the glucose drink.     Fundal height is a measurement of your uterus to check your baby's growth. This number is usually the same as the number of weeks that you have been pregnant. Your baby's heart rate  will be checked. Follow up with your doctor or obstetrician as directed:  Write down your questions so you remember to ask them during your visits. © Copyright [employer] 2023 Information is for End User's use only and may not be sold, redistributed or otherwise used for commercial purposes. The above information is an  only. It is not intended as medical advice for individual conditions or treatments. Talk to your doctor, nurse or pharmacist before following any medical regimen to see if it is safe and effective for you.

## 2023-10-13 NOTE — ASSESSMENT & PLAN NOTE
Amber Rivera is a 33 y.o.  21w5d  TWG 6.078 kg (13 lb 6.4 oz)  Feels well. Denies LOF/CTX/VB. No concerns. AFP completed Neg  Vaccines discussed   Flu vaccine declined today , will decide for next visit    labor precautions reviewed. Encouraged adequate hydration and nutrition  Pregnancy Essential guide and [facility] web site recommended.

## 2023-10-31 ENCOUNTER — ULTRASOUND (OUTPATIENT)
Dept: PERINATAL CARE | Facility: OTHER | Age: 28
End: 2023-10-31
Payer: COMMERCIAL

## 2023-10-31 VITALS
HEART RATE: 87 BPM | SYSTOLIC BLOOD PRESSURE: 114 MMHG | HEIGHT: 64 IN | WEIGHT: 138.8 LBS | BODY MASS INDEX: 23.7 KG/M2 | DIASTOLIC BLOOD PRESSURE: 60 MMHG

## 2023-10-31 DIAGNOSIS — Z3A.24 24 WEEKS GESTATION OF PREGNANCY: ICD-10-CM

## 2023-10-31 DIAGNOSIS — Z36.3 ENCOUNTER FOR ANTENATAL SCREENING FOR MALFORMATION: Primary | ICD-10-CM

## 2023-10-31 PROCEDURE — 76816 OB US FOLLOW-UP PER FETUS: CPT | Performed by: OBSTETRICS & GYNECOLOGY

## 2023-10-31 PROCEDURE — 99212 OFFICE O/P EST SF 10 MIN: CPT | Performed by: OBSTETRICS & GYNECOLOGY

## 2023-10-31 NOTE — LETTER
October 31, 2023     Sabiha Vizcaino, [address]    Patient: Pina Fisher   YOB: 1995   Date of Visit: 10/31/2023       Dear Dr. Alyce Hugo: Thank you for referring Basilia Curling to me for evaluation. Below are my notes for this consultation. If you have questions, please do not hesitate to call me. I look forward to following your patient along with you. Sincerely,        Eliud Marshall MD        CC: No Recipients    Eliud Marshall MD  10/31/2023 10:14 AM  Sign when Signing Visit  Pina Fisher  has no complaints today at 24w2d. She reports fetal movements and does not report any vaginal bleeding or signs of labor. Her recently completed fetal testing revealed a normal NIPT and MSAFP. She is here today for a ultrasound for missed anatomy and growth. She reports she had an episode of anxiety earlier in pregnancy that has since resolved. I asked if she wanted to keep this on her problem list which she declined. Ultrasound findings: The ultrasound today shows normal interval fetal growth and fluid. The anatomy of the cardiac septum appears normal which completes her anatomical survey. Pregnancy ultrasound has limitations and is unable to detect all forms of fetal congenital abnormalities. Specific counseling was provided on the following problems:  [address] [place] [address] and her partner did go to [facility] during pregnancy and she did remember getting a few mosquito bites but never had any symptoms to suggest she developed a zika infection. Follow up recommended:   No further follow-up is recommended at this time. Pre visit time reviewing her records   5 minutes  Face to face time 5 minutes  Post visit time on documentation of note, updating her problem list, adding orders and prescriptions 5 minutes. Procedures that were completed today were charged separately.    The level of decision making was straight forward.     Allan Álvarez MD

## 2023-10-31 NOTE — PROGRESS NOTES
Sammy Chang  has no complaints today at 24w2d. She reports fetal movements and does not report any vaginal bleeding or signs of labor. Her recently completed fetal testing revealed a normal NIPT and MSAFP. She is here today for a ultrasound for missed anatomy and growth. She reports she had an episode of anxiety earlier in pregnancy that has since resolved. I asked if she wanted to keep this on her problem list which she declined. Ultrasound findings: The ultrasound today shows normal interval fetal growth and fluid. The anatomy of the cardiac septum appears normal which completes her anatomical survey. Pregnancy ultrasound has limitations and is unable to detect all forms of fetal congenital abnormalities. Specific counseling was provided on the following problems:  [address] [place] [address] and her partner did go to [facility] during pregnancy and she did remember getting a few mosquito bites but never had any symptoms to suggest she developed a zika infection. Follow up recommended:   No further follow-up is recommended at this time. Pre visit time reviewing her records   5 minutes  Face to face time 5 minutes  Post visit time on documentation of note, updating her problem list, adding orders and prescriptions 5 minutes. Procedures that were completed today were charged separately. The level of decision making was straight forward.     Fish Merida MD

## 2023-11-07 NOTE — PATIENT INSTRUCTIONS
Pregnancy at 23 to 26 Weeks   AMBULATORY CARE:   What changes are happening to your body: You are now close to or at the beginning of the third trimester. The third trimester starts at 24 weeks and ends with delivery. As your baby gets larger, you may develop certain symptoms. These may include pain in your back or down the sides of your abdomen. You may also have stretch marks on your abdomen, breasts, thighs, or buttocks. You may also have constipation. Seek care immediately if:   You develop a severe headache that does not go away. You have new or increased vision changes, such as blurred or spotted vision. You have new or increased swelling in your face or hands. You have vaginal spotting or bleeding. Your water broke or you feel warm water gushing or trickling from your vagina. Call your doctor or obstetrician if:   You have abdominal cramps, pressure, or tightening. You have a change in vaginal discharge. You have light bleeding. You have chills or a fever. You have vaginal itching, burning, or pain. You have yellow, green, white, or foul-smelling vaginal discharge. You have pain or burning when you urinate, less urine than usual, or pink or bloody urine. You have questions or concerns about your condition or care. How to care for yourself at this stage of your pregnancy:       Eat a variety of healthy foods. Healthy foods include fruits, vegetables, whole-grain breads, low-fat dairy foods, beans, lean meats, and fish. Drink liquids as directed. Ask how much liquid to drink each day and which liquids are best for you. Limit caffeine to less than 200 milligrams each day. Limit your intake of fish to 2 servings each week. Choose fish low in mercury such as canned light tuna, shrimp, salmon, cod, or tilapia. Do not  eat fish high in mercury such as swordfish, tilefish, bella mackerel, and shark. Manage back pain.   Do not stand for long periods of time or lift heavy items. Use good posture while you stand, squat, or bend. Wear low-heeled shoes with good support. Rest may also help to relieve back pain. Ask your healthcare provider about exercises you can do to strengthen your back muscles. Take prenatal vitamins as directed. Your need for certain vitamins and minerals, such as folic acid, increases during pregnancy. Prenatal vitamins provide some of the extra vitamins and minerals you need. Prenatal vitamins may also help to decrease the risk of certain birth defects. Talk to your healthcare provider about exercise. Moderate exercise can help you stay fit. Your healthcare provider will help you plan an exercise program that is safe for you during pregnancy. Do not smoke. Smoking increases your risk of a miscarriage and other health problems during your pregnancy. Smoking can cause your baby to be born too early or weigh less at birth. Ask your healthcare provider for information if you need help quitting. Do not drink alcohol. Alcohol passes from your body to your baby through the placenta. It can affect your baby's brain development and cause fetal alcohol syndrome (FAS). FAS is a group of conditions that causes mental, behavior, and growth problems. Talk to your healthcare provider before you take any medicines. Many medicines may harm your baby if you take them when you are pregnant. Do not take any medicines, vitamins, herbs, or supplements without first talking to your healthcare provider. Never use illegal or street drugs (such as marijuana or cocaine) while you are pregnant. Safety tips during pregnancy:   Avoid hot tubs and saunas. Do not use a hot tub or sauna while you are pregnant, especially during your first trimester. Hot tubs and saunas may raise your baby's temperature and increase the risk of birth defects. Avoid toxoplasmosis. This is an infection caused by eating raw meat or being around infected cat feces.  It can cause birth defects, miscarriages, and other problems. Wash your hands after you touch raw meat. Make sure any meat is well-cooked before you eat it. Avoid raw eggs and unpasteurized milk. Use gloves or ask someone else to clean your cat's litter box while you are pregnant. Changes that are happening with your baby:  By 26 weeks, your baby will weigh about 2 pounds. Your baby will be about 10 inches long from the top of the head to the rump (baby's bottom). Your baby's movements are much stronger now. Your baby's eyes are almost completely formed and can partially open. Your baby also sleeps and wakes up. What you need to know about prenatal care: Your healthcare provider will check your blood pressure and weight. You may also need the following:  A urine test  may also be done to check for sugar and protein. These can be signs of gestational diabetes or infection. Protein in your urine may also be a sign of preeclampsia. Preeclampsia is a condition that can develop during week 20 or later of your pregnancy. It causes high blood pressure, and it can cause problems with your kidneys and other organs. A gestational diabetes screen  may be done. Your healthcare provider may order either a 1-step or 2-step oral glucose tolerance test (OGTT). 1-step OGTT:  Your blood sugar level will be tested after you have not eaten for 8 hours (fasting). You will then be given a glucose drink. Your level will be tested again 1 hour and 2 hours after you finish the drink. 2-step OGTT:  You do not have to fast for the first part of the test. You will have the glucose drink at any time of day. Your blood sugar level will be checked 1 hour later. If your blood sugar is higher than a certain level, another test will be ordered. You will fast and your blood sugar level will be tested. You will have the glucose drink. Your blood will be tested again 1 hour, 2 hours, and 3 hours after you finish the glucose drink.     Fundal height is a measurement of your uterus to check your baby's growth. This number is usually the same as the number of weeks that you have been pregnant. Your baby's heart rate  will be checked. Follow up with your doctor or obstetrician as directed:  Write down your questions so you remember to ask them during your visits. © Copyright [employer] 2023 Information is for End User's use only and may not be sold, redistributed or otherwise used for commercial purposes. The above information is an  only. It is not intended as medical advice for individual conditions or treatments. Talk to your doctor, nurse or pharmacist before following any medical regimen to see if it is safe and effective for you. Round Ligament Pain   WHAT YOU NEED TO KNOW:   What is round ligament pain? Round ligament pain is caused when ligaments are stretched as your uterus (womb) gets bigger during pregnancy. Round ligaments are found on each side of your uterus. They are bands of tissue that hold the uterus in place. Round ligament pain happens most often during the second trimester. It is a normal part of pregnancy and should stop by the third trimester. The pain is not serious and will not hurt your baby. What are the signs and symptoms of round ligament pain? Pain on one or both sides of your lower abdomen or groin that may move up to your hip    Spasms in the muscles in your abdomen    Pain that lasts a few seconds    Pain that happens when you exercise, sneeze, change positions, or stand quickly    How is round ligament pain diagnosed? Your healthcare provider will examine you and ask about your pain. Tell the provider when the pain started, and if you feel it on one or both sides. Your provider may ask if anything helps the pain or makes it worse. What can I do to manage my pain? Round ligament pain does not need to be treated. The following may help make you more comfortable:  Rest as often as you can. Rest can help relieve round ligament pain. You might want to lie on the side that has pain. Place a pillow under your abdomen. Keep another pillow between your knees. Move slowly. Sudden movement can stretch the ligaments and cause pain. Stand, sit, and change positions slowly. Try to tighten the muscles in your hips before you sneeze or laugh. You can also sit down and bring your knees up toward your abdomen. This can help relieve tension on the ligaments. Exercise as directed. Gentle exercise can keep the ligaments loose and strengthen core (abdominal) muscles. An example is swimming, or a yoga program designed for pregnancy. Ask your healthcare provider which exercises are safe for you and how often to exercise. For most healthy women, a good goal is to try to get at least 30 minutes of exercise every day. If activity causes pain, try not to walk too long or too far at one time. Break your exercise up into short amounts. Apply a warm compress to the area. Warmth can relieve pain and muscle spasms. Ask your healthcare provider if you can take a warm bath or use a heating pad. Keep all heat settings low. High heat can be dangerous for your baby. Do not sit in a hot tub or use hot water in your bath. You may also be able to massage the area gently while you are applying heat. Massage can help relieve pain. Ask about pain medicines. Ask your healthcare provider before you take any medicine during pregnancy, including over-the-counter pain medicines. Your healthcare provider may recommend acetaminophen to relieve the pain. Ask how much to take and how often to take it. Follow directions. Acetaminophen can cause liver damage. Too much medicine can be harmful to your baby. When should I contact my healthcare provider? You have pain that is spreading to other parts of your body. You have new or worsening pain. You have pain that lasts longer than a few minutes at a time.     You have questions or concerns about your condition or care. CARE AGREEMENT:   You have the right to help plan your care. Learn about your health condition and how it may be treated. Discuss treatment options with your healthcare providers to decide what care you want to receive. You always have the right to refuse treatment. The above information is an  only. It is not intended as medical advice for individual conditions or treatments. Talk to your doctor, nurse or pharmacist before following any medical regimen to see if it is safe and effective for you. © Copyright [employer] 2023 Information is for End User's use only and may not be sold, redistributed or otherwise used for commercial purposes.

## 2023-11-08 ENCOUNTER — ROUTINE PRENATAL (OUTPATIENT)
Dept: OBGYN CLINIC | Facility: CLINIC | Age: 28
End: 2023-11-08
Payer: COMMERCIAL

## 2023-11-08 VITALS
SYSTOLIC BLOOD PRESSURE: 110 MMHG | BODY MASS INDEX: 24.07 KG/M2 | WEIGHT: 141 LBS | DIASTOLIC BLOOD PRESSURE: 68 MMHG | HEIGHT: 64 IN

## 2023-11-08 DIAGNOSIS — Z34.02 ENCOUNTER FOR SUPERVISION OF NORMAL FIRST PREGNANCY IN SECOND TRIMESTER: Primary | ICD-10-CM

## 2023-11-08 PROBLEM — Z3A.25 25 WEEKS GESTATION OF PREGNANCY: Status: ACTIVE | Noted: 2023-08-10

## 2023-11-08 LAB
SL AMB  POCT GLUCOSE, UA: NORMAL
SL AMB POCT URINE PROTEIN: NORMAL

## 2023-11-08 PROCEDURE — 81002 URINALYSIS NONAUTO W/O SCOPE: CPT | Performed by: OBSTETRICS & GYNECOLOGY

## 2023-11-08 PROCEDURE — PNV: Performed by: OBSTETRICS & GYNECOLOGY

## 2023-11-08 NOTE — PROGRESS NOTES
Problem   25 Weeks Gestation of Pregnancy    Blood Type: O+      Pap  Neg    . GC/CT  Neg  PN1 Labs:     H&H: 12.1/34.6  28 Week Labs:  AFP: Neg   Level 1:completed   Level 2:  Tdap:  Flu: declines   GBS swab:     Blue folder: give   Yellow folder:     Breast pump order:  L&D forms:    Delivery consent:   IOL:            25 weeks gestation of pregnancy  Jennifer Romo is a 33 y.o.  25w3d  TWG 8.618 kg (19 lb)  28 week labs ordered   Feels well. Denies LOF/CTX/VB. No concerns. AFP completed  Vaccines discussed , declines flu vaccine    labor precautions reviewed. Encouraged adequate hydration and nutrition  Pregnancy Essential guide and Baby and Me center web site recommended.

## 2023-11-08 NOTE — PROGRESS NOTES
25w3d  Pap 2023. Negative  GC/CT:2023 Neg / Neg   PN1 Labs: 2023  Blood Type: O   Positive :   MFM Level 1: 8/10/2023  MFM Level 2: 10/3/2023  AFP:   28 Week Labs: Given scripts at today's visit . TDap:  Flu:  declines  GBS:   Blue Folder: given   Yellow Folder: Given on   Ped Referral : Given on 2023  Breast pump:  L&D forms:  Delivery consent:     Patient reports positive fetal movements with no concerns .

## 2023-11-08 NOTE — ASSESSMENT & PLAN NOTE
Pina Fisher is a 33 y.o.  25w3d  TWG 8.618 kg (19 lb)  28 week labs ordered   Feels well. Denies LOF/CTX/VB. No concerns. AFP completed  Vaccines discussed , declines flu vaccine    labor precautions reviewed. Encouraged adequate hydration and nutrition  Pregnancy Essential guide and Baby and Me center web site recommended.

## 2023-11-22 ENCOUNTER — APPOINTMENT (OUTPATIENT)
Dept: LAB | Facility: HOSPITAL | Age: 28
End: 2023-11-22
Payer: COMMERCIAL

## 2023-11-22 DIAGNOSIS — Z34.02 ENCOUNTER FOR SUPERVISION OF NORMAL FIRST PREGNANCY IN SECOND TRIMESTER: ICD-10-CM

## 2023-11-22 LAB
BASOPHILS # BLD AUTO: 0.05 THOUSANDS/ÂΜL (ref 0–0.1)
BASOPHILS NFR BLD AUTO: 0 % (ref 0–1)
EOSINOPHIL # BLD AUTO: 0.07 THOUSAND/ÂΜL (ref 0–0.61)
EOSINOPHIL NFR BLD AUTO: 1 % (ref 0–6)
ERYTHROCYTE [DISTWIDTH] IN BLOOD BY AUTOMATED COUNT: 13.2 % (ref 11.6–15.1)
GLUCOSE 1H P 50 G GLC PO SERPL-MCNC: 96 MG/DL (ref 40–134)
HCT VFR BLD AUTO: 37.1 % (ref 34.8–46.1)
HGB BLD-MCNC: 12.1 G/DL (ref 11.5–15.4)
IMM GRANULOCYTES # BLD AUTO: 0.11 THOUSAND/UL (ref 0–0.2)
IMM GRANULOCYTES NFR BLD AUTO: 1 % (ref 0–2)
LYMPHOCYTES # BLD AUTO: 1.28 THOUSANDS/ÂΜL (ref 0.6–4.47)
LYMPHOCYTES NFR BLD AUTO: 11 % (ref 14–44)
MCH RBC QN AUTO: 30.5 PG (ref 26.8–34.3)
MCHC RBC AUTO-ENTMCNC: 32.6 G/DL (ref 31.4–37.4)
MCV RBC AUTO: 94 FL (ref 82–98)
MONOCYTES # BLD AUTO: 0.51 THOUSAND/ÂΜL (ref 0.17–1.22)
MONOCYTES NFR BLD AUTO: 5 % (ref 4–12)
NEUTROPHILS # BLD AUTO: 9.29 THOUSANDS/ÂΜL (ref 1.85–7.62)
NEUTS SEG NFR BLD AUTO: 82 % (ref 43–75)
NRBC BLD AUTO-RTO: 0 /100 WBCS
PLATELET # BLD AUTO: 303 THOUSANDS/UL (ref 149–390)
PMV BLD AUTO: 8.7 FL (ref 8.9–12.7)
RBC # BLD AUTO: 3.97 MILLION/UL (ref 3.81–5.12)
TREPONEMA PALLIDUM IGG+IGM AB [PRESENCE] IN SERUM OR PLASMA BY IMMUNOASSAY: NORMAL
WBC # BLD AUTO: 11.31 THOUSAND/UL (ref 4.31–10.16)

## 2023-11-22 PROCEDURE — 36415 COLL VENOUS BLD VENIPUNCTURE: CPT

## 2023-11-22 PROCEDURE — 86780 TREPONEMA PALLIDUM: CPT

## 2023-11-22 PROCEDURE — 82950 GLUCOSE TEST: CPT

## 2023-11-22 PROCEDURE — 85025 COMPLETE CBC W/AUTO DIFF WBC: CPT

## 2023-11-28 ENCOUNTER — ROUTINE PRENATAL (OUTPATIENT)
Age: 28
End: 2023-11-28
Payer: COMMERCIAL

## 2023-11-28 VITALS — SYSTOLIC BLOOD PRESSURE: 104 MMHG | WEIGHT: 144 LBS | BODY MASS INDEX: 24.72 KG/M2 | DIASTOLIC BLOOD PRESSURE: 72 MMHG

## 2023-11-28 DIAGNOSIS — Z34.82 ENCOUNTER FOR SUPERVISION OF OTHER NORMAL PREGNANCY, SECOND TRIMESTER: Primary | ICD-10-CM

## 2023-11-28 DIAGNOSIS — Z3A.28 28 WEEKS GESTATION OF PREGNANCY: ICD-10-CM

## 2023-11-28 LAB
DME PARACHUTE DELIVERY DATE REQUESTED: NORMAL
DME PARACHUTE ITEM DESCRIPTION: NORMAL
DME PARACHUTE ORDER STATUS: NORMAL
DME PARACHUTE SUPPLIER NAME: NORMAL
DME PARACHUTE SUPPLIER PHONE: NORMAL
SL AMB  POCT GLUCOSE, UA: NEGATIVE
SL AMB POCT URINE PROTEIN: NEGATIVE

## 2023-11-28 PROCEDURE — 81002 URINALYSIS NONAUTO W/O SCOPE: CPT | Performed by: STUDENT IN AN ORGANIZED HEALTH CARE EDUCATION/TRAINING PROGRAM

## 2023-11-28 PROCEDURE — PNV: Performed by: STUDENT IN AN ORGANIZED HEALTH CARE EDUCATION/TRAINING PROGRAM

## 2023-11-28 NOTE — ASSESSMENT & PLAN NOTE
-precautions reviewed  -declined flu vaccine, will get tdap next visit probably  -delivery consent signed  -prepregnancy BMI 20 with goal weight gain 25-35#: TWG = 22#

## 2023-11-28 NOTE — PROGRESS NOTES
Pt is here for routine ob visit   No concerns at this time  Urine neg/neg   No LOF,VB,Contractions   +FM   28wk Labs completed  Declined flu vaccine   Tdap declined today   Yellow folder given/reviewed w/ pt  Delivery Consent signed today   Breast Pump ordered   Peds Referral placed at previous visit

## 2023-11-28 NOTE — PROGRESS NOTES
33 y.o.  at 28w2d, here for routine OB visit. Feeling well overall and without concerns. Good FM. Denies LOF, VB, contractions. Denies dysuria, hematuria. No problems with activity.      Problem List Items Addressed This Visit          Other    28 weeks gestation of pregnancy     -precautions reviewed  -declined flu vaccine, will get tdap next visit probably  -delivery consent signed  -prepregnancy BMI 20 with goal weight gain 25-35#: TWG = 22#          Other Visit Diagnoses       Encounter for supervision of other normal pregnancy, second trimester    -  Primary    Relevant Orders    POCT urine dip (Completed)

## 2023-12-01 ENCOUNTER — TELEPHONE (OUTPATIENT)
Dept: OBGYN CLINIC | Facility: CLINIC | Age: 28
End: 2023-12-01

## 2023-12-01 NOTE — TELEPHONE ENCOUNTER
Called pt- pt states she had some vag. Blding after IC. States it has subsided now, just a little with wiping. Informed pt is normal after IC to have sm vag blding/spotting. Advised to abstain for a few days, rest , observe for now. Reviewed S & S of concern, ie:  blding, cxtns, LOF, Pelvic pain. Advised pt to call with any further questions/concerns.

## 2023-12-12 PROBLEM — Z3A.30 30 WEEKS GESTATION OF PREGNANCY: Status: ACTIVE | Noted: 2023-08-10

## 2023-12-12 NOTE — PATIENT INSTRUCTIONS
Acute Rash   WHAT YOU NEED TO KNOW:   A rash is irritated, red, or itchy skin or mucus membranes, such as the lining of your nose or throat. Acute means the rash starts suddenly, worsens quickly, and lasts a short time. Common causes include a disease or infection, a reaction to something you are allergic to, or certain medicines. DISCHARGE INSTRUCTIONS:   Return to the emergency department if:   You have sudden trouble breathing or chest pain. You are vomiting, have a headache or muscle aches, and your throat hurts. Call your doctor or dermatologist if:   You have a fever. You get open wounds from scratching your skin, or you have a wound that is red, swollen, or painful. Your rash lasts longer than 3 months. You have swelling or pain in your joints. You have questions or concerns about your condition or care. Medicines:  If your rash does not go away on its own, you may need the following medicines:  Antihistamines  may be given to help decrease itching. Steroids  may be given to decrease inflammation. Antibiotics  help fight or prevent a bacterial infection. Take your medicine as directed. Contact your healthcare provider if you think your medicine is not helping or if you have side effects. Tell your provider if you are allergic to any medicine. Keep a list of the medicines, vitamins, and herbs you take. Include the amounts, and when and why you take them. Bring the list or the pill bottles to follow-up visits. Carry your medicine list with you in case of an emergency. Prevent a rash or care for your skin when you have a rash:  Dry skin can lead to more problems. Do not scratch your skin if it itches. You may cause a skin infection by scratching. The following may prevent dry skin, and help your skin look better:  Help soothe your rash. Apply thick cream lotions or petroleum jelly. Cool compresses may also soothe your skin.  Apply a cool compress or a cool, wet towel, and then cover it with a dry towel. Use lukewarm water when you bathe. Hot water may damage your skin more. Pat your skin dry. Do not rub your skin with a towel. Use detergents, soaps, shampoos, and bubble baths  made for sensitive skin. Wear clothes made of cotton instead of nylon or wool. Cotton is softer, so it will not hurt your skin as much. Follow up with your healthcare providers as directed:  A dermatologist may help find the cause of your rash or help plan or change treatment. A dietitian may help with meal planning if you have a food allergy. Write down your questions so you remember to ask them during your visits. © Copyright Dave Kelly 2023 Information is for End User's use only and may not be sold, redistributed or otherwise used for commercial purposes. The above information is an  only. It is not intended as medical advice for individual conditions or treatments. Talk to your doctor, nurse or pharmacist before following any medical regimen to see if it is safe and effective for you. Dermatitis   WHAT YOU NEED TO KNOW:   What is dermatitis? Dermatitis is skin inflammation. Dermatitis may be caused by allergens such as dust mites, pet dander, pollen, and certain foods. Dermatitis can also develop when something touches your skin and irritates it or causes an allergic reaction. Examples include soaps, chemicals, latex, and poison ivy. What are the signs and symptoms of dermatitis? You may have any of the following, depending on the cause: An itchy rash    Redness    Bumps or blisters that crust over or ooze clear fluid    Swelling    How is dermatitis diagnosed? Your healthcare provider will examine your skin. He or she will ask about your rash and any other symptoms you have. Tell him or her if you noticed anything trigger your rash, such as a certain food or activity. Tell him or her about any medicines you are taking or any allergies or medical conditions you have.   How is dermatitis treated? Treatment depends on the cause of your rash. You may need medicines to help decrease itching and inflammation or treat a bacterial infection. They may be given as a topical cream, shot, or a pill. How can I manage dermatitis? Apply a cool compress to your rash. This will help soothe your skin. Apply lotions or creams to the area. These help keep your skin moist and decrease itching. Apply the lotion or cream right after a lukewarm bath or shower when your skin is still damp. Use products that do not contain dye or a scent. Avoid skin irritants. Examples include makeup, hair products, soaps, and cleansers. Use products that do not contain a scent or dye. Call your local emergency number (911 in the [address]) or have someone call if:   You have symptoms of anaphylaxis, such as sudden trouble breathing, throat swelling, or feeling dizzy or lightheaded. When should I seek immediate care? You develop a fever or have red streaks going up your arm or leg. Your rash gets more swollen, red, or hot. When should I call my doctor? Your skin blisters, oozes white or yellow pus, or has a foul-smelling discharge. Your rash spreads or does not get better, even after treatment. You have questions or concerns about your condition or care. CARE AGREEMENT:   You have the right to help plan your care. Learn about your health condition and how it may be treated. Discuss treatment options with your healthcare providers to decide what care you want to receive. You always have the right to refuse treatment. The above information is an  only. It is not intended as medical advice for individual conditions or treatments. Talk to your doctor, nurse or pharmacist before following any medical regimen to see if it is safe and effective for you.   © Copyright Cyrus Godinez 2023 Information is for End User's use only and may not be sold, redistributed or otherwise used for commercial purposes. Pregnancy at 31 to 34 Weeks   AMBULATORY CARE:   Changes happening with your body: You may continue to have symptoms such as shortness of breath, heartburn, contractions, or swelling of your ankles and feet. You may be gaining about 1 pound a week now. Seek care immediately if:   You develop a severe headache that does not go away. You have new or increased vision changes, such as blurred or spotted vision. You have new or increased swelling in your face or hands. You have vaginal spotting or bleeding. Your water broke or you feel warm water gushing or trickling from your vagina. Call your obstetrician if:   You have more than 5 contractions in 1 hour. You notice any changes in your baby's movements. You have abdominal cramps, pressure, or tightening. You have a change in vaginal discharge. You have chills or a fever. You have vaginal itching, burning, or pain. You have yellow, green, white, or foul-smelling vaginal discharge. You have pain or burning when you urinate, less urine than usual, or pink or bloody urine. You have questions or concerns about your condition or care. How to care for yourself at this stage of your pregnancy:       Eat a variety of healthy foods. Healthy foods include fruits, vegetables, whole-grain breads, low-fat dairy foods, beans, lean meats, and fish. Drink liquids as directed. Ask how much liquid to drink each day and which liquids are best for you. Limit caffeine to less than 200 milligrams each day. Limit your intake of fish to 2 servings each week. Choose fish low in mercury such as canned light tuna, shrimp, salmon, cod, or tilapia. Do not  eat fish high in mercury such as swordfish, tilefish, bella mackerel, and shark. Manage heartburn  by eating 4 or 5 small meals each day instead of large meals. Avoid spicy food. Manage swelling  by lying down and putting your feet up. Take prenatal vitamins as directed. Your need for certain vitamins and minerals, such as folic acid, increases during pregnancy. Prenatal vitamins provide some of the extra vitamins and minerals you need. Prenatal vitamins may also help to decrease the risk of certain birth defects. Talk to your healthcare provider about exercise. Moderate exercise can help you stay fit. Your healthcare provider will help you plan an exercise program that is safe for you during pregnancy. Do not smoke. Smoking increases your risk of a miscarriage and other health problems during your pregnancy. Smoking can cause your baby to be born too early or weigh less at birth. Ask your healthcare provider for information if you need help quitting. Do not drink alcohol. Alcohol passes from your body to your baby through the placenta. It can affect your baby's brain development and cause fetal alcohol syndrome (FAS). FAS is a group of conditions that causes mental, behavior, and growth problems. Talk to your healthcare provider before you take any medicines. Many medicines may harm your baby if you take them when you are pregnant. Do not take any medicines, vitamins, herbs, or supplements without first talking to your healthcare provider. Never use illegal or street drugs (such as marijuana or cocaine) while you are pregnant. Safety tips during pregnancy:   Avoid hot tubs and saunas. Do not use a hot tub or sauna while you are pregnant, especially during your first trimester. Hot tubs and saunas may raise your baby's temperature and increase the risk of birth defects. Avoid toxoplasmosis. This is an infection caused by eating raw meat or being around infected cat feces. It can cause birth defects, miscarriages, and other problems. Wash your hands after you touch raw meat. Make sure any meat is well-cooked before you eat it. Avoid raw eggs and unpasteurized milk. Use gloves or ask someone else to clean your cat's litter box while you are pregnant. Changes happening with your baby:  By 34 weeks, your baby may weigh more than 5 pounds. Your baby will be about 12 ½ inches long from the top of the head to the rump (baby's bottom). Your baby is gaining about ½ pound a week. Your baby's eyes open and close now. Your baby's kicks and movements are more forceful at this time. What you need to know about prenatal care: Your healthcare provider will check your blood pressure and weight. You may also need the following:  A urine test  may also be done to check for sugar and protein. These can be signs of gestational diabetes or infection. Protein in your urine may also be a sign of preeclampsia. Preeclampsia is a condition that can develop during week 20 or later of your pregnancy. It causes high blood pressure, and it can cause problems with your kidneys and other organs. A gestational diabetes screen  may be done. Your healthcare provider may order either a 1-step or 2-step oral glucose tolerance test (OGTT). 1-step OGTT:  Your blood sugar level will be tested after you have not eaten for 8 hours (fasting). You will then be given a glucose drink. Your level will be tested again 1 hour and 2 hours after you finish the drink. 2-step OGTT:  You do not have to fast for the first part of the test. You will have the glucose drink at any time of day. Your blood sugar level will be checked 1 hour later. If your blood sugar is higher than a certain level, another test will be ordered. You will fast and your blood sugar level will be tested. You will have the glucose drink. Your blood will be tested again 1 hour, 2 hours, and 3 hours after you finish the glucose drink. A Tdap vaccine  may be recommended by your healthcare provider. Fundal height  is a measurement of your uterus to check your baby's growth. This number is usually the same as the number of weeks that you have been pregnant.  Your healthcare provider may also check your baby's position. Your baby's heart rate  will be checked. Follow up with your obstetrician as directed:  Write down your questions so you remember to ask them during your visits. © Copyright Sherry Gómez 2023 Information is for End User's use only and may not be sold, redistributed or otherwise used for commercial purposes. The above information is an  only. It is not intended as medical advice for individual conditions or treatments. Talk to your doctor, nurse or pharmacist before following any medical regimen to see if it is safe and effective for you. Kick Counts in Pregnancy   AMBULATORY CARE:   Kick counts  measure how much your baby is moving in your womb. A kick from your baby can be felt as a twist, turn, swish, roll, or jab. It is common to feel your baby kicking at 26 to 28 weeks of pregnancy. You may feel your baby kick as early as 20 weeks of pregnancy. You may want to start counting at 28 weeks. Contact your doctor immediately if:   You feel a change in the number of kicks or movements of your baby. You feel fewer than 10 kicks within 2 hours. You have questions or concerns about your baby's movements. Why measure kick counts:  Your baby's movement may provide information about your baby's health. He or she may move less, or not at all, if there are problems. Your baby may move less if he or she is not getting enough oxygen or nutrition from the placenta. Do not smoke while you are pregnant. Smoking decreases the amount of oxygen that gets to your baby. Talk to your healthcare provider if you need help to quit smoking. Tell your healthcare provider as soon as you feel a change in your baby's movements. When to measure kick counts:   Measure kick counts at the same time every day. Measure kick counts when your baby is awake and most active. Your baby may be most active in the evening.     How to measure kick counts:  Check that your baby is awake before you measure kick counts. You can wake up your baby by lightly pushing on your belly, walking, or drinking something cold. Your healthcare provider may tell you different ways to measure kick counts. You may be told to do the following:  Use a chart or clock to keep track of the time you start and finish counting. Sit in a chair or lie on your left side. Place your hands on the largest part of your belly. Count until you reach 10 kicks. Write down how much time it takes to count 10 kicks. It may take 30 minutes to 2 hours to count 10 kicks. It should not take more than 2 hours to count 10 kicks. Follow up with your doctor as directed:  Write down your questions so you remember to ask them during your visits. © Copyright Dave Kelly 2023 Information is for End User's use only and may not be sold, redistributed or otherwise used for commercial purposes. The above information is an  only. It is not intended as medical advice for individual conditions or treatments. Talk to your doctor, nurse or pharmacist before following any medical regimen to see if it is safe and effective for you. Perineal Massage    Perineal massage is recommended starting after 34 weeks in order to reduce risks of perineal tearing during childbirth. You have been provided and instructional sheet in your yellow 28 week prenatal packet.

## 2023-12-12 NOTE — ASSESSMENT & PLAN NOTE
Meliton Eisenberg is a 33 y.o.   30w3d who presents for routine PNV. 28 week labs reviewed: NML  TWG 9.979 kg (22 lb)   Good fetal movement. Denies contractions, cramping, leakage of fluid or vaginal bleeding. Reports vaginal itching 4 days ago, has mostly resolved at today visit, thinks it might be a yeast infection, vaginal exam revels white thick clumpy vaginal discharge. Treatment sent in for yeast infection if insurance does not cover patient was advised to  Monistat over-the-counter 7 night treatment  Reports skin rash left armpit, advised to apply hydrocortisone ointment 2 times daily for the next 7 days as this looks like eczema  Advised to discontinue all body washes and other fragranced lotions or products. Advised to change to white Dove bar soap only. She may use coconut oil for overall dryness. She reports a fine spotty rash on her abdomen, advised she may take Claritin 10 mg over-the-counter daily. She may take Benadryl 25 mg nightly for sleep and itching patient has been advised to report any increase in symptoms, otherwise she is feeling fine  Tdap vaccine needs  Reviewed  labor precautions and FKCs.    Advised to start Perineal massage at 34-36 weeks   Pregnancy Essential guide and Baby and Me web site recommended

## 2023-12-13 ENCOUNTER — ROUTINE PRENATAL (OUTPATIENT)
Dept: OBGYN CLINIC | Facility: CLINIC | Age: 28
End: 2023-12-13

## 2023-12-13 VITALS
BODY MASS INDEX: 24.92 KG/M2 | DIASTOLIC BLOOD PRESSURE: 70 MMHG | HEIGHT: 64 IN | WEIGHT: 146 LBS | SYSTOLIC BLOOD PRESSURE: 120 MMHG

## 2023-12-13 DIAGNOSIS — Z3A.30 30 WEEKS GESTATION OF PREGNANCY: ICD-10-CM

## 2023-12-13 DIAGNOSIS — Z34.03 ENCOUNTER FOR SUPERVISION OF NORMAL FIRST PREGNANCY IN THIRD TRIMESTER: Primary | ICD-10-CM

## 2023-12-13 DIAGNOSIS — R21 RASH: ICD-10-CM

## 2023-12-13 DIAGNOSIS — B37.9 YEAST INFECTION: ICD-10-CM

## 2023-12-13 LAB
SL AMB  POCT GLUCOSE, UA: NORMAL
SL AMB POCT URINE PROTEIN: NORMAL

## 2023-12-13 NOTE — PROGRESS NOTES
30w3d  Pap 2023. Negative  GC/CT:2023 Neg/Neg  PN1 Labs: 2023  Blood Type: O Positive:   MFM Level 1: 8/10/2023  MFM Level 2: 10/3/2023  AFP: 2023  28 Week Labs: 2023  TDap:  Flu:  GBS:   Blue Folder: Given  Yellow Folder: Given  Ped Referral : Given  Breast pump:  L&D forms:  Delivery consent:     Patient reports positive fetal movements with no contractions and no lost of fluids . Patient does reports some vaginal itching for the last 4 days . Patient also reports a rash all over tummy and under her armpit .

## 2023-12-13 NOTE — PROGRESS NOTES
Problem   30 Weeks Gestation of Pregnancy    Blood Type: O+      Pap  Neg    . GC/CT  Neg  PN1 Labs:     H&H: 12.1/34.6  28 Week Labs:NML  AFP: Neg   Level 1:completed   Level 2:  Tdap:  Flu: declines   GBS swab:     Blue folder: give   Yellow folder:     Breast pump order:  L&D forms:    Delivery consent:   IOL:            30 weeks gestation of pregnancy  Katarzyna Bowser is a 33 y.o.   30w3d who presents for routine PNV. 28 week labs reviewed: NML  TWG 9.979 kg (22 lb)   Good fetal movement. Denies contractions, cramping, leakage of fluid or vaginal bleeding. Reports vaginal itching 4 days ago, has mostly resolved at today visit, thinks it might be a yeast infection, vaginal exam revels white thick clumpy vaginal discharge. Treatment sent in for yeast infection if insurance does not cover patient was advised to  Monistat over-the-counter 7 night treatment  Reports skin rash left armpit, advised to apply hydrocortisone ointment 2 times daily for the next 7 days as this looks like eczema  Advised to discontinue all body washes and other fragranced lotions or products. Advised to change to white Dove bar soap only. She may use coconut oil for overall dryness. She reports a fine spotty rash on her abdomen, advised she may take Claritin 10 mg over-the-counter daily. She may take Benadryl 25 mg nightly for sleep and itching patient has been advised to report any increase in symptoms, otherwise she is feeling fine  Tdap vaccine needs  Reviewed  labor precautions and FKCs.    Advised to start Perineal massage at 34-36 weeks   Pregnancy Essential guide and Baby and Me web site recommended

## 2023-12-21 LAB
DME PARACHUTE DELIVERY DATE REQUESTED: NORMAL
DME PARACHUTE ITEM DESCRIPTION: NORMAL
DME PARACHUTE ORDER STATUS: NORMAL
DME PARACHUTE SUPPLIER NAME: NORMAL
DME PARACHUTE SUPPLIER PHONE: NORMAL

## 2023-12-22 PROBLEM — Z3A.32 32 WEEKS GESTATION OF PREGNANCY: Status: ACTIVE | Noted: 2023-08-10

## 2023-12-22 NOTE — ASSESSMENT & PLAN NOTE
Winnie Lopez is a 28 y.o.   32w3d who presents for routine PNV.  28 week labs reviewed: nml  TWG 11.8 kg (26 lb)   Denies OB complaints. Good fetal movement. Denies contractions, cramping, leakage of fluid or vaginal bleeding.   Tdap vaccine completed today, we discussed the RSV vaccine, she will do some research at home and decide, vaccine information sheet provided  Reviewed  labor precautions and FKCs.   Advised to start Perineal massage at 34-36 weeks   Pregnancy Essential guide and Baby and Me web site recommended

## 2023-12-22 NOTE — PATIENT INSTRUCTIONS
Pregnancy at 31 to 34 Weeks   AMBULATORY CARE:   Changes happening with your body:  You may continue to have symptoms such as shortness of breath, heartburn, contractions, or swelling of your ankles and feet. You may be gaining about 1 pound a week now.  Seek care immediately if:   You develop a severe headache that does not go away.    You have new or increased vision changes, such as blurred or spotted vision.    You have new or increased swelling in your face or hands.    You have vaginal spotting or bleeding.    Your water broke or you feel warm water gushing or trickling from your vagina.    Call your obstetrician if:   You have more than 5 contractions in 1 hour.    You notice any changes in your baby's movements.    You have abdominal cramps, pressure, or tightening.    You have a change in vaginal discharge.    You have chills or a fever.    You have vaginal itching, burning, or pain.    You have yellow, green, white, or foul-smelling vaginal discharge.    You have pain or burning when you urinate, less urine than usual, or pink or bloody urine.    You have questions or concerns about your condition or care.    How to care for yourself at this stage of your pregnancy:       Eat a variety of healthy foods.  Healthy foods include fruits, vegetables, whole-grain breads, low-fat dairy foods, beans, lean meats, and fish. Drink liquids as directed. Ask how much liquid to drink each day and which liquids are best for you. Limit caffeine to less than 200 milligrams each day. Limit your intake of fish to 2 servings each week. Choose fish low in mercury such as canned light tuna, shrimp, salmon, cod, or tilapia. Do not  eat fish high in mercury such as swordfish, tilefish, bella mackerel, and shark.         Manage heartburn  by eating 4 or 5 small meals each day instead of large meals. Avoid spicy food.    Manage swelling  by lying down and putting your feet up.         Take prenatal vitamins as directed.  Your need  for certain vitamins and minerals, such as folic acid, increases during pregnancy. Prenatal vitamins provide some of the extra vitamins and minerals you need. Prenatal vitamins may also help to decrease the risk of certain birth defects.         Talk to your healthcare provider about exercise.  Moderate exercise can help you stay fit. Your healthcare provider will help you plan an exercise program that is safe for you during pregnancy.         Do not smoke.  Smoking increases your risk of a miscarriage and other health problems during your pregnancy. Smoking can cause your baby to be born too early or weigh less at birth. Ask your healthcare provider for information if you need help quitting.    Do not drink alcohol.  Alcohol passes from your body to your baby through the placenta. It can affect your baby's brain development and cause fetal alcohol syndrome (FAS). FAS is a group of conditions that causes mental, behavior, and growth problems.    Talk to your healthcare provider before you take any medicines.  Many medicines may harm your baby if you take them when you are pregnant. Do not take any medicines, vitamins, herbs, or supplements without first talking to your healthcare provider. Never use illegal or street drugs (such as marijuana or cocaine) while you are pregnant.  Safety tips during pregnancy:   Avoid hot tubs and saunas.  Do not use a hot tub or sauna while you are pregnant, especially during your first trimester. Hot tubs and saunas may raise your baby's temperature and increase the risk of birth defects.    Avoid toxoplasmosis.  This is an infection caused by eating raw meat or being around infected cat feces. It can cause birth defects, miscarriages, and other problems. Wash your hands after you touch raw meat. Make sure any meat is well-cooked before you eat it. Avoid raw eggs and unpasteurized milk. Use gloves or ask someone else to clean your cat's litter box while you are pregnant.        Changes happening with your baby:  By 34 weeks, your baby may weigh more than 5 pounds. Your baby will be about 12 ½ inches long from the top of the head to the rump (baby's bottom). Your baby is gaining about ½ pound a week. Your baby's eyes open and close now. Your baby's kicks and movements are more forceful at this time.  What you need to know about prenatal care:  Your healthcare provider will check your blood pressure and weight. You may also need the following:  A urine test  may also be done to check for sugar and protein. These can be signs of gestational diabetes or infection. Protein in your urine may also be a sign of preeclampsia. Preeclampsia is a condition that can develop during week 20 or later of your pregnancy. It causes high blood pressure, and it can cause problems with your kidneys and other organs.    A gestational diabetes screen  may be done. Your healthcare provider may order either a 1-step or 2-step oral glucose tolerance test (OGTT).     1-step OGTT:  Your blood sugar level will be tested after you have not eaten for 8 hours (fasting). You will then be given a glucose drink. Your level will be tested again 1 hour and 2 hours after you finish the drink.    2-step OGTT:  You do not have to fast for the first part of the test. You will have the glucose drink at any time of day. Your blood sugar level will be checked 1 hour later. If your blood sugar is higher than a certain level, another test will be ordered. You will fast and your blood sugar level will be tested. You will have the glucose drink. Your blood will be tested again 1 hour, 2 hours, and 3 hours after you finish the glucose drink.    A Tdap vaccine  may be recommended by your healthcare provider.    Fundal height  is a measurement of your uterus to check your baby's growth. This number is usually the same as the number of weeks that you have been pregnant. Your healthcare provider may also check your baby's  position.    Your baby's heart rate  will be checked.    Follow up with your obstetrician as directed:  Write down your questions so you remember to ask them during your visits.  © Copyright [employer] 2023 Information is for End User's use only and may not be sold, redistributed or otherwise used for commercial purposes.  The above information is an  only. It is not intended as medical advice for individual conditions or treatments. Talk to your doctor, nurse or pharmacist before following any medical regimen to see if it is safe and effective for you.  Kick Counts in Pregnancy   AMBULATORY CARE:   Kick counts  measure how much your baby is moving in your womb. A kick from your baby can be felt as a twist, turn, swish, roll, or jab. It is common to feel your baby kicking at 26 to 28 weeks of pregnancy. You may feel your baby kick as early as 20 weeks of pregnancy. You may want to start counting at 28 weeks.   Contact your doctor immediately if:   You feel a change in the number of kicks or movements of your baby.     You feel fewer than 10 kicks within 2 hours.     You have questions or concerns about your baby's movements.    Why measure kick counts:  Your baby's movement may provide information about your baby's health. He or she may move less, or not at all, if there are problems. Your baby may move less if he or she is not getting enough oxygen or nutrition from the placenta. Do not smoke while you are pregnant. Smoking decreases the amount of oxygen that gets to your baby. Talk to your healthcare provider if you need help to quit smoking. Tell your healthcare provider as soon as you feel a change in your baby's movements.  When to measure kick counts:   Measure kick counts at the same time every day.      Measure kick counts when your baby is awake and most active. Your baby may be most active in the evening.    How to measure kick counts:  Check that your baby is awake before you measure kick  counts. You can wake up your baby by lightly pushing on your belly, walking, or drinking something cold. Your healthcare provider may tell you different ways to measure kick counts. You may be told to do the following:  Use a chart or clock to keep track of the time you start and finish counting.     Sit in a chair or lie on your left side.     Place your hands on the largest part of your belly.     Count until you reach 10 kicks. Write down how much time it takes to count 10 kicks.     It may take 30 minutes to 2 hours to count 10 kicks. It should not take more than 2 hours to count 10 kicks.    Follow up with your doctor as directed:  Write down your questions so you remember to ask them during your visits.   © Copyright [employer] 2023 Information is for End User's use only and may not be sold, redistributed or otherwise used for commercial purposes.  The above information is an  only. It is not intended as medical advice for individual conditions or treatments. Talk to your doctor, nurse or pharmacist before following any medical regimen to see if it is safe and effective for you.      Perineal Massage    Perineal massage is recommended starting after 34 weeks in order to reduce risks of perineal tearing during childbirth.  You have been provided and instructional sheet in your yellow 28 week prenatal packet.       Early Labor Signs   AMBULATORY CARE:   Early labor signs and symptoms  are the changes in your body that signal your baby is getting ready to be delivered. Early labor signs can happen weeks, days or hours before delivery.  Call 911 for any of the following:   You have heavy vaginal bleeding.    You cannot get to the hospital before the baby starts to come out.    Seek care immediately if:   You have regular, painful contractions that are less than 5 minutes apart and last 30 to 70 seconds each.    You have a constant trickle or sudden gush of clear fluid from your vagina.    You notice a  sudden decrease in your baby's movement.    Contact your obstetrician or healthcare provider if:   You have pain in your lower back or abdomen that does not get better when you change positions.    You have bloody mucus or show.    You have questions or concerns about your condition or care.    Early labor signs and symptoms:   Lightening  occurs when your baby drops inside your pelvis. You may feel increased pressure in your pelvis. This may happen a few weeks to a few hours before your labor begins.    Contractions  are cramps and tightening that occur in your uterus to help move the baby through your birth canal. Contractions occur regularly and more often each time. Each one lasts about 30 to 70 seconds, and gets stronger until you deliver your baby. Contractions do not go away with movement. The pain usually starts in your lower back and moves to your abdomen.     Effacement  occurs when your cervix softens and thins, so it can easily open for the baby. You will not be able to feel effacement. Your healthcare provider will examine your cervix for effacement.     Dilation  is widening of your cervix. Your healthcare provider will examine your cervix for dilation. Your cervix may start to dilate weeks before your baby is delivered. Your cervix will be fully opened and ready for delivery when it is dilated to 10 centimeters.     Increased discharge  from your vagina may occur. It may be brown, pink, clear, or slightly bloody. This discharge may also be called bloody show. Bloody show is a mucus plug that forms and blocks your cervix during pregnancy. The discharge may mean that your cervix is opening up and getting ready for delivery.    Rupture of membranes  is a sudden release of clear fluid from your vagina. Ruptured membranes means your water broke. Your healthcare provider may need to break your water if it does not happen on its own.    False labor: You may have false labor signs, which are also called  [place] contractions. False labor is common and may happen several weeks or days before your actual labor. The contractions are not regular, and do not get closer together. The pain is usually mild, does not worsen, and is felt only in front. [place] contractions may happen later in the day, and stop after you change position, walk, or rest.  Follow up with your obstetrician or healthcare provider as directed:  Write down your questions so you remember to ask them during your visit.  © Copyright [employer] 2023 Information is for End User's use only and may not be sold, redistributed or otherwise used for commercial purposes.  The above information is an  only. It is not intended as medical advice for individual conditions or treatments. Talk to your doctor, nurse or pharmacist before following any medical regimen to see if it is safe and effective for you.

## 2023-12-26 NOTE — PROGRESS NOTES
32w2d  Pap 2023. Negative  GC/CT: Neg / Neg   PN1 Labs: 2023  Blood Type: O  Positive :   MFM Level 1:8/10/2023  MFM Level 2: 10/3/2023  AFP: 2023  28 Week Labs:2023  Normal   TDap: Given at today visit .  Flu:  GBS:   Blue Folder: given   Yellow Folder: given   Ped Referral : given   Breast pump:2023 was order   L&D forms:  Delivery consent:     Patient reports positive fetal movements with no contractions and no lost of fluids at this time .  Patient reports that she felt some BK but nothing major .

## 2023-12-27 ENCOUNTER — TELEPHONE (OUTPATIENT)
Dept: PEDIATRICS CLINIC | Facility: CLINIC | Age: 28
End: 2023-12-27

## 2023-12-27 ENCOUNTER — ROUTINE PRENATAL (OUTPATIENT)
Dept: OBGYN CLINIC | Facility: CLINIC | Age: 28
End: 2023-12-27
Payer: COMMERCIAL

## 2023-12-27 VITALS
HEIGHT: 64 IN | WEIGHT: 148 LBS | SYSTOLIC BLOOD PRESSURE: 120 MMHG | DIASTOLIC BLOOD PRESSURE: 72 MMHG | BODY MASS INDEX: 25.27 KG/M2

## 2023-12-27 DIAGNOSIS — Z23 NEED FOR TDAP VACCINATION: ICD-10-CM

## 2023-12-27 DIAGNOSIS — Z34.03 ENCOUNTER FOR SUPERVISION OF NORMAL FIRST PREGNANCY IN THIRD TRIMESTER: Primary | ICD-10-CM

## 2023-12-27 LAB
SL AMB  POCT GLUCOSE, UA: NORMAL
SL AMB POCT URINE PROTEIN: NORMAL

## 2023-12-27 PROCEDURE — PNV: Performed by: OBSTETRICS & GYNECOLOGY

## 2023-12-27 PROCEDURE — 81002 URINALYSIS NONAUTO W/O SCOPE: CPT | Performed by: OBSTETRICS & GYNECOLOGY

## 2023-12-27 PROCEDURE — 90471 IMMUNIZATION ADMIN: CPT | Performed by: OBSTETRICS & GYNECOLOGY

## 2023-12-27 PROCEDURE — 90715 TDAP VACCINE 7 YRS/> IM: CPT | Performed by: OBSTETRICS & GYNECOLOGY

## 2023-12-27 NOTE — PROGRESS NOTES
Problem   32 Weeks Gestation of Pregnancy    Blood Type: O+      Pap  Neg    . GC/CT  Neg  PN1 Labs:     H&H: 12.1/34.6  28 Week Labs:NML  AFP: Neg   Level 1:completed   Level 2:  Tdap:23  Flu: declines   GBS swab:     Blue folder: give   Yellow folder:     Breast pump order:  L&D forms:    Delivery consent:   IOL:            32 weeks gestation of pregnancy  Winnie Lopez is a 28 y.o.   32w3d who presents for routine PNV.  28 week labs reviewed: nml  TWG 11.8 kg (26 lb)   Denies OB complaints. Good fetal movement. Denies contractions, cramping, leakage of fluid or vaginal bleeding.   Tdap vaccine completed today, we discussed the RSV vaccine, she will do some research at home and decide, vaccine information sheet provided  Reviewed  labor precautions and FKCs.   Advised to start Perineal massage at 34-36 weeks   Pregnancy Essential guide and Baby and Me web site recommended

## 2024-01-09 ENCOUNTER — ROUTINE PRENATAL (OUTPATIENT)
Dept: OBGYN CLINIC | Facility: CLINIC | Age: 29
End: 2024-01-09
Payer: COMMERCIAL

## 2024-01-09 VITALS
DIASTOLIC BLOOD PRESSURE: 66 MMHG | WEIGHT: 151.2 LBS | BODY MASS INDEX: 25.81 KG/M2 | HEIGHT: 64 IN | SYSTOLIC BLOOD PRESSURE: 110 MMHG | HEART RATE: 94 BPM

## 2024-01-09 DIAGNOSIS — Z34.03 ENCOUNTER FOR SUPERVISION OF NORMAL FIRST PREGNANCY IN THIRD TRIMESTER: Primary | ICD-10-CM

## 2024-01-09 DIAGNOSIS — Z3A.34 34 WEEKS GESTATION OF PREGNANCY: ICD-10-CM

## 2024-01-09 LAB
SL AMB  POCT GLUCOSE, UA: NORMAL
SL AMB POCT URINE PROTEIN: NORMAL

## 2024-01-09 PROCEDURE — PNV

## 2024-01-09 PROCEDURE — 81002 URINALYSIS NONAUTO W/O SCOPE: CPT

## 2024-01-09 NOTE — PROGRESS NOTES
Problem   34 Weeks Gestation of Pregnancy    Blood Type: O+      Pap  Neg    . GC/CT  Neg  PN1 Labs:     H&H: 12.1/34.6  28 Week Labs:NML  AFP: Neg   Level 1:completed     Tdap:23  Flu: declines   GBS swab:     Blue folder: give     Breast pump order: ordered  L&D forms:     Delivery consent: signed  IOL:            34 weeks gestation of pregnancy  Winnie Lopez is a 29 y.o.   34w2d who presents for routine PNV.  Prepregnancy BMI 20.93 with a goal weight gain 11.5 kg (25 lb)-16 kg (35 lb). TWG 13.2 kg (29 lb 3.2 oz)  Denies OB complaints. Good fetal movement. Denies CTX/LOF/VB    Continue PNV.   Reviewed  labor precautions and FKCs.   Advised to start Perineal massage at 34-36 weeks   Pregnancy Essential guide and Baby and Me web site recommended

## 2024-01-09 NOTE — ASSESSMENT & PLAN NOTE
Winnie Lopez is a 29 y.o.   34w2d who presents for routine PNV.  Prepregnancy BMI 20.93 with a goal weight gain 11.5 kg (25 lb)-16 kg (35 lb). TWG 13.2 kg (29 lb 3.2 oz)  Denies OB complaints. Good fetal movement. Denies CTX/LOF/VB    Continue PNV.   Reviewed  labor precautions and FKCs.   Advised to start Perineal massage at 34-36 weeks   Pregnancy Essential guide and Baby and Me web site recommended

## 2024-01-09 NOTE — PATIENT INSTRUCTIONS
Pregnancy at 31 to 34 Weeks   AMBULATORY CARE:   Changes happening with your body:  You may continue to have symptoms such as shortness of breath, heartburn, contractions, or swelling of your ankles and feet. You may be gaining about 1 pound a week now.  Seek care immediately if:   You develop a severe headache that does not go away.    You have new or increased vision changes, such as blurred or spotted vision.    You have new or increased swelling in your face or hands.    You have vaginal spotting or bleeding.    Your water broke or you feel warm water gushing or trickling from your vagina.    Call your obstetrician if:   You have more than 5 contractions in 1 hour.    You notice any changes in your baby's movements.    You have abdominal cramps, pressure, or tightening.    You have a change in vaginal discharge.    You have chills or a fever.    You have vaginal itching, burning, or pain.    You have yellow, green, white, or foul-smelling vaginal discharge.    You have pain or burning when you urinate, less urine than usual, or pink or bloody urine.    You have questions or concerns about your condition or care.    How to care for yourself at this stage of your pregnancy:       Eat a variety of healthy foods.  Healthy foods include fruits, vegetables, whole-grain breads, low-fat dairy foods, beans, lean meats, and fish. Drink liquids as directed. Ask how much liquid to drink each day and which liquids are best for you. Limit caffeine to less than 200 milligrams each day. Limit your intake of fish to 2 servings each week. Choose fish low in mercury such as canned light tuna, shrimp, salmon, cod, or tilapia. Do not  eat fish high in mercury such as swordfish, tilefish, bella mackerel, and shark.         Manage heartburn  by eating 4 or 5 small meals each day instead of large meals. Avoid spicy food.    Manage swelling  by lying down and putting your feet up.         Take prenatal vitamins as directed.  Your need  for certain vitamins and minerals, such as folic acid, increases during pregnancy. Prenatal vitamins provide some of the extra vitamins and minerals you need. Prenatal vitamins may also help to decrease the risk of certain birth defects.         Talk to your healthcare provider about exercise.  Moderate exercise can help you stay fit. Your healthcare provider will help you plan an exercise program that is safe for you during pregnancy.         Do not smoke.  Smoking increases your risk of a miscarriage and other health problems during your pregnancy. Smoking can cause your baby to be born too early or weigh less at birth. Ask your healthcare provider for information if you need help quitting.    Do not drink alcohol.  Alcohol passes from your body to your baby through the placenta. It can affect your baby's brain development and cause fetal alcohol syndrome (FAS). FAS is a group of conditions that causes mental, behavior, and growth problems.    Talk to your healthcare provider before you take any medicines.  Many medicines may harm your baby if you take them when you are pregnant. Do not take any medicines, vitamins, herbs, or supplements without first talking to your healthcare provider. Never use illegal or street drugs (such as marijuana or cocaine) while you are pregnant.  Safety tips during pregnancy:   Avoid hot tubs and saunas.  Do not use a hot tub or sauna while you are pregnant, especially during your first trimester. Hot tubs and saunas may raise your baby's temperature and increase the risk of birth defects.    Avoid toxoplasmosis.  This is an infection caused by eating raw meat or being around infected cat feces. It can cause birth defects, miscarriages, and other problems. Wash your hands after you touch raw meat. Make sure any meat is well-cooked before you eat it. Avoid raw eggs and unpasteurized milk. Use gloves or ask someone else to clean your cat's litter box while you are pregnant.        Changes happening with your baby:  By 34 weeks, your baby may weigh more than 5 pounds. Your baby will be about 12 ½ inches long from the top of the head to the rump (baby's bottom). Your baby is gaining about ½ pound a week. Your baby's eyes open and close now. Your baby's kicks and movements are more forceful at this time.  What you need to know about prenatal care:  Your healthcare provider will check your blood pressure and weight. You may also need the following:  A urine test  may also be done to check for sugar and protein. These can be signs of gestational diabetes or infection. Protein in your urine may also be a sign of preeclampsia. Preeclampsia is a condition that can develop during week 20 or later of your pregnancy. It causes high blood pressure, and it can cause problems with your kidneys and other organs.    A gestational diabetes screen  may be done. Your healthcare provider may order either a 1-step or 2-step oral glucose tolerance test (OGTT).     1-step OGTT:  Your blood sugar level will be tested after you have not eaten for 8 hours (fasting). You will then be given a glucose drink. Your level will be tested again 1 hour and 2 hours after you finish the drink.    2-step OGTT:  You do not have to fast for the first part of the test. You will have the glucose drink at any time of day. Your blood sugar level will be checked 1 hour later. If your blood sugar is higher than a certain level, another test will be ordered. You will fast and your blood sugar level will be tested. You will have the glucose drink. Your blood will be tested again 1 hour, 2 hours, and 3 hours after you finish the glucose drink.    A Tdap vaccine  may be recommended by your healthcare provider.    Fundal height  is a measurement of your uterus to check your baby's growth. This number is usually the same as the number of weeks that you have been pregnant. Your healthcare provider may also check your baby's  position.    Your baby's heart rate  will be checked.    Follow up with your obstetrician as directed:  Write down your questions so you remember to ask them during your visits.  ©  [employer] Information is for End User's use only and may not be sold, redistributed or otherwise used for commercial purposes.  The above information is an  only. It is not intended as medical advice for individual conditions or treatments. Talk to your doctor, nurse or pharmacist before following any medical regimen to see if it is safe and effective for you.   Labor   AMBULATORY CARE:    (premature) labor  occurs when the uterus contracts and your cervix opens earlier than normal. The cervix is the opening of your uterus.  labor happens after the 20th week of pregnancy but before the 37th week. You may have premature rupture of membranes (PROM). PROM means your water broke before labor began. An early labor could cause you to have your baby before he or she is ready to be born.       Common signs and symptoms include the following:  You may not know that you are having  labor. It is common to have  contractions (tightening and relaxing of the uterus) and not notice them. The following are signs and symptoms that suggest a  labor:  Contractions that get stronger and closer together    Changes in vaginal discharge, such as more discharge or discharge that is watery or bloody     Low back pain     Pressure in the lower abdomen     Vaginal spotting or bleeding    Call your local emergency number (911 in the [place]) if:   You see or feel like there is something in your vagina.      Call your doctor if:   You have bright red, painless vaginal bleeding.    Your symptoms do not get better or they get worse.    Your water broke or you feel warm water gushing or trickling from your vagina.    You have contractions that get stronger and closer together for more than 1 hour.     You  notice a decrease in your baby's movement.    You have abdominal cramps, pressure, or tightening.    You have a change in vaginal discharge.    You have a fever.     You have burning when you urinate or you are urinating less than is normal for you.    You have questions or concerns about your condition or care.    How  labor is diagnosed:  You may have one or more of the following tests to check for  labor:  A pelvic exam  is also called an internal or vaginal exam. During a pelvic exam, your healthcare provider will gently put a warmed speculum into your vagina. A speculum is a tool that opens your vagina. This lets your healthcare provider see if your cervix is opening.    A vaginal ultrasound  uses sound waves to show pictures of your cervix and your baby inside your uterus. During this test, a small tube is placed into your vagina. This test will help your healthcare provider see if your cervix is opening.    A fetal ultrasound  uses sound waves to show pictures of your baby inside your uterus. The movement, heart rate, and position of your baby can also be seen.         A fetal fibronectin test  checks for a protein called fetal fibronectin in the cervix or vagina. Normally, there is no protein in cervical and vaginal secretions until the 20th week of pregnancy up to the end of pregnancy.    Blood and urine tests  may be done to look for signs of infection.    Treatment for  labor  may delay delivery. You may need any of the following:  Bed rest  may be recommended. You may need to lie on your left side, which improves circulation to the uterus and baby. Your healthcare provider will tell you when it is okay to get out of bed.    Medicine  may be given to stop contractions if your baby is not ready to be born. You may also need certain medicines if your  labor cannot be stopped and your healthcare provider thinks you will have your baby early. These medicines help your baby's lungs,  brain, and digestive organs mature. They also help decrease your baby's risk of being born with cerebral palsy. If you have PROM, fluid from your vagina or rectum will be checked for a strep infection. You may be given antibiotics to prevent a strep infection during delivery. Antibiotics may also be used to prevent labor from starting. You may also need steroids to decrease the risk for complications due to  labor.    Self-care:   Rest  as much as possible. You may need to lie on your left side to improve circulation to the uterus and baby. You may be able to prevent  labor by resting and reducing your physical activity.    Ask your healthcare provider about activities that are safe for you to do.  Your healthcare provider or obstetrician may recommend that you avoid sexual intercourse. Ask your healthcare provider if exercise is safe.     Drink liquids as directed.  Ask how much liquid to drink each day and which liquids are best for you.     Do not smoke.  Your baby may not grow well and he or she may weigh less at birth if you smoke during pregnancy. Smoking also increases the risk that your baby will be born too early. Nicotine and other chemicals in cigarettes and cigars can cause lung damage. Ask your healthcare provider for information if you currently smoke and need help to quit. E-cigarettes or smokeless tobacco still contain nicotine. Talk to your healthcare provider before you use these products.     Do not drink alcohol.  Alcohol may harm your unborn baby and cause  labor.     Maintain a healthy weight.  A healthy weight may prevent  labor. Ask your healthcare provider how much weight you should gain during your pregnancy.    Follow up with your doctor as directed:  Write down your questions so you remember to ask them during your visits.  © Copyright [employer] Information is for End User's use only and may not be sold, redistributed or otherwise used for commercial  purposes.  The above information is an  only. It is not intended as medical advice for individual conditions or treatments. Talk to your doctor, nurse or pharmacist before following any medical regimen to see if it is safe and effective for you.

## 2024-01-09 NOTE — PROGRESS NOTES
Prenatal Visit 34w2d    Patient denies any lof, vb or ctx. Patient has +fm.  Patient urine is neg/neg  Tdap utd  Delivery consent signed  Labs is utd  Patient has no concerns today.

## 2024-01-22 ENCOUNTER — ROUTINE PRENATAL (OUTPATIENT)
Dept: OBGYN CLINIC | Facility: MEDICAL CENTER | Age: 29
End: 2024-01-22
Payer: COMMERCIAL

## 2024-01-22 VITALS
SYSTOLIC BLOOD PRESSURE: 112 MMHG | BODY MASS INDEX: 25.57 KG/M2 | WEIGHT: 149.8 LBS | DIASTOLIC BLOOD PRESSURE: 72 MMHG | HEIGHT: 64 IN | HEART RATE: 90 BPM

## 2024-01-22 DIAGNOSIS — Z34.83 PRENATAL CARE, SUBSEQUENT PREGNANCY IN THIRD TRIMESTER: Primary | ICD-10-CM

## 2024-01-22 DIAGNOSIS — R82.71 GBS BACTERIURIA: ICD-10-CM

## 2024-01-22 LAB
SL AMB  POCT GLUCOSE, UA: NORMAL
SL AMB POCT URINE PROTEIN: NORMAL

## 2024-01-22 PROCEDURE — 81002 URINALYSIS NONAUTO W/O SCOPE: CPT | Performed by: STUDENT IN AN ORGANIZED HEALTH CARE EDUCATION/TRAINING PROGRAM

## 2024-01-22 PROCEDURE — PNV: Performed by: STUDENT IN AN ORGANIZED HEALTH CARE EDUCATION/TRAINING PROGRAM

## 2024-01-22 NOTE — ASSESSMENT & PLAN NOTE
28yo  at 36.1wks presents for routine prenatal visit     Pt denies contractions, vaginal bleeding, leakage of fluid. Endorses fetal movement.     -continue PNVs   -s/p Tdap, declined flu vaccine   -delivery consent signed.   -discussed spontaneous labor vs 39wk elective induction. Pt would like to go into spontaneous labor at this time   - labor precautions provided   -return in 1 week

## 2024-01-22 NOTE — PROGRESS NOTES
34 weeks gestation of pregnancy  28yo  at 36.1wks presents for routine prenatal visit     Pt denies contractions, vaginal bleeding, leakage of fluid. Endorses fetal movement.     -continue PNVs   -s/p Tdap, declined flu vaccine   -delivery consent signed.   -discussed spontaneous labor vs 39wk elective induction. Pt would like to go into spontaneous labor at this time   - labor precautions provided   -return in 1 week     GBS bacteriuria  -will need antibiotics in labor

## 2024-01-24 PROBLEM — Z3A.37 37 WEEKS GESTATION OF PREGNANCY: Status: ACTIVE | Noted: 2023-08-10

## 2024-01-24 NOTE — PATIENT INSTRUCTIONS
Pregnancy at 35 to 38 Weeks   AMBULATORY CARE:   Changes happening with your body:  You are considered full term at the beginning of 37 weeks. Your breathing may be easier if your baby has moved down into a head-down position. You may need to urinate more often because the baby may be pressing on your bladder. You may also feel more discomfort and get tired easily.   Seek care immediately if:   You develop a severe headache that does not go away.    You have new or increased vision changes, such as blurred or spotted vision.    You have new or increased swelling in your face or hands.    You have vaginal spotting or bleeding.    Your water broke or you feel warm water gushing or trickling from your vagina.    Call your obstetrician if:   You have more than 5 contractions in 1 hour.    You notice any changes in your baby's movements.    You have abdominal cramps, pressure, or tightening.    You have a change in vaginal discharge.    You have chills or a fever.    You have vaginal itching, burning, or pain.    You have yellow, green, white, or foul-smelling vaginal discharge.    You have pain or burning when you urinate, less urine than usual, or pink or bloody urine.    You have questions or concerns about your condition or care.    How to care for yourself at this stage of your pregnancy:       Eat a variety of healthy foods.  Healthy foods include fruits, vegetables, whole-grain breads, low-fat dairy foods, beans, lean meats, and fish. Drink liquids as directed. Ask how much liquid to drink each day and which liquids are best for you. Limit caffeine to less than 200 milligrams each day. Limit your intake of fish to 2 servings each week. Choose fish low in mercury such as canned light tuna, shrimp, salmon, cod, or tilapia. Do not  eat fish high in mercury such as swordfish, tilefish, bella mackerel, and shark.         Take prenatal vitamins as directed.  Your need for certain vitamins and minerals, such as folic  acid, increases during pregnancy. Prenatal vitamins provide some of the extra vitamins and minerals you need. Prenatal vitamins may also help to decrease the risk of certain birth defects.         Rest as needed.  Put your feet up if you have swelling in your ankles and feet.         Talk to your healthcare provider about exercise.  Moderate exercise can help you stay fit. Your healthcare provider will help you plan an exercise program that is safe for you during pregnancy.         Do not smoke.  Smoking increases your risk of a miscarriage and other health problems during your pregnancy. Smoking can cause your baby to be born early or weigh less at birth. Ask your healthcare provider for information if you need help quitting.    Do not drink alcohol.  Alcohol passes from your body to your baby through the placenta. It can affect your baby's brain development and cause fetal alcohol syndrome (FAS). FAS is a group of conditions that causes mental, behavior, and growth problems.    Talk to your healthcare provider before you take any medicines.  Many medicines may harm your baby if you take them when you are pregnant. Do not take any medicines, vitamins, herbs, or supplements without first talking to your healthcare provider. Never use illegal or street drugs (such as marijuana or cocaine) while you are pregnant.  Safety tips during pregnancy:   Avoid hot tubs and saunas.  Do not use a hot tub or sauna while you are pregnant, especially during your first trimester. Hot tubs and saunas may raise your baby's temperature and increase the risk of birth defects.    Avoid toxoplasmosis.  This is an infection caused by eating raw meat or being around infected cat feces. It can cause birth defects, miscarriages, and other problems. Wash your hands after you touch raw meat. Make sure any meat is well-cooked before you eat it. Avoid raw eggs and unpasteurized milk. Use gloves or ask someone else to clean your cat's litter box  while you are pregnant.         Ask your healthcare provider about travel.  The most comfortable time to travel is during the second trimester. Ask your provider if you can travel after 36 weeks. You may not be able to travel in an airplane after 36 weeks. He or she may also recommend you avoid long road trips.    Changes happening with your baby:  By 38 weeks, your baby may weigh between 6 and 9 pounds. Your baby may be about 14 inches long from the top of the head to the rump (baby's bottom). Your baby hears well enough to know your voice. As your baby gets larger, you may feel fewer kicks and more stretching and rolling. Your baby may move into a head-down position. Your baby will also rest lower in your abdomen.  What you need to know about prenatal care:  Your healthcare provider will check your blood pressure and weight. You may also need the following:  A urine test  may also be done to check for sugar and protein. These can be signs of gestational diabetes or infection. Protein in your urine may also be a sign of preeclampsia. Preeclampsia is a condition that can develop during week 20 or later of your pregnancy. It causes high blood pressure, and it can cause problems with your kidneys and other organs.    A gestational diabetes screen  may be done. Your healthcare provider may order either a 1-step or 2-step oral glucose tolerance test (OGTT).     1-step OGTT:  Your blood sugar level will be tested after you have not eaten for 8 hours (fasting). You will then be given a glucose drink. Your level will be tested again 1 hour and 2 hours after you finish the drink.    2-step OGTT:  You do not have to fast for the first part of the test. You will have the glucose drink at any time of day. Your blood sugar level will be checked 1 hour later. If your blood sugar is higher than a certain level, another test will be ordered. You will fast and your blood sugar level will be tested. You will have the glucose drink.  Your blood will be tested again 1 hour, 2 hours, and 3 hours after you finish the glucose drink.    A blood test  may be done to check for anemia (low iron level).    A Tdap vaccine  may be recommended by your healthcare provider.    A group B strep test  is a test that is done to check for group B strep infection. Group B strep is a type of bacteria that may be found in the vagina or rectum. It can be passed to your baby during delivery if you have it. Your healthcare provider will take swab your vagina or rectum and send the sample to the lab for tests.    Fundal height  is a measurement of your uterus to check your baby's growth. This number is usually the same as the number of weeks that you have been pregnant. Your healthcare provider may also check your baby's position.    Your baby's heart rate  will be checked.    Follow up with your obstetrician as directed:  Write down your questions so you remember to ask them during your visits.  © Copyright [employer] 2023 Information is for End User's use only and may not be sold, redistributed or otherwise used for commercial purposes.  The above information is an  only. It is not intended as medical advice for individual conditions or treatments. Talk to your doctor, nurse or pharmacist before following any medical regimen to see if it is safe and effective for you.

## 2024-01-29 ENCOUNTER — ROUTINE PRENATAL (OUTPATIENT)
Dept: OBGYN CLINIC | Facility: CLINIC | Age: 29
End: 2024-01-29
Payer: COMMERCIAL

## 2024-01-29 VITALS
SYSTOLIC BLOOD PRESSURE: 102 MMHG | BODY MASS INDEX: 25.92 KG/M2 | DIASTOLIC BLOOD PRESSURE: 78 MMHG | HEART RATE: 89 BPM | WEIGHT: 151 LBS

## 2024-01-29 DIAGNOSIS — R82.71 GBS BACTERIURIA: ICD-10-CM

## 2024-01-29 DIAGNOSIS — Z3A.37 37 WEEKS GESTATION OF PREGNANCY: ICD-10-CM

## 2024-01-29 DIAGNOSIS — Z34.83 PRENATAL CARE, SUBSEQUENT PREGNANCY IN THIRD TRIMESTER: Primary | ICD-10-CM

## 2024-01-29 LAB
SL AMB  POCT GLUCOSE, UA: NORMAL
SL AMB POCT URINE PROTEIN: NORMAL

## 2024-01-29 PROCEDURE — PNV: Performed by: NURSE PRACTITIONER

## 2024-01-29 PROCEDURE — 81002 URINALYSIS NONAUTO W/O SCOPE: CPT | Performed by: NURSE PRACTITIONER

## 2024-01-29 NOTE — PROGRESS NOTES
Problem   37 Weeks Gestation of Pregnancy    Blood Type: O+      Pap  Neg    GC/CT  Neg  PN1 Labs:     H&H: 12.1/34.6  28 Week Labs:NML  AFP: Neg   Level 1:completed     Tdap:12/27/23  Flu: declines again  GBS swab: GBS bacteruria    Blue folder: given     Breast pump order: ordered  L&D forms: to be submitted next visit, still working on them    Delivery consent: signed  IOL:            37 weeks gestation of pregnancy  She feels well. She denies LOF/CTX/VB. She did not voice any concerns. Discussed fetal kick counting.  She was encouraged to continue with her perineal/vaginal massages to prevent lacerations during the labor process.

## 2024-01-29 NOTE — ASSESSMENT & PLAN NOTE
She feels well. She denies LOF/CTX/VB. She did not voice any concerns. Discussed fetal kick counting.  She was encouraged to continue with her perineal/vaginal massages to prevent lacerations during the labor process. Cervical check was declined.

## 2024-02-05 ENCOUNTER — ROUTINE PRENATAL (OUTPATIENT)
Age: 29
End: 2024-02-05
Payer: COMMERCIAL

## 2024-02-05 VITALS
HEIGHT: 64 IN | BODY MASS INDEX: 25.88 KG/M2 | OXYGEN SATURATION: 99 % | HEART RATE: 85 BPM | SYSTOLIC BLOOD PRESSURE: 100 MMHG | DIASTOLIC BLOOD PRESSURE: 60 MMHG | WEIGHT: 151.6 LBS

## 2024-02-05 DIAGNOSIS — Z3A.38 38 WEEKS GESTATION OF PREGNANCY: ICD-10-CM

## 2024-02-05 DIAGNOSIS — Z34.83 PRENATAL CARE, SUBSEQUENT PREGNANCY IN THIRD TRIMESTER: Primary | ICD-10-CM

## 2024-02-05 LAB
SL AMB  POCT GLUCOSE, UA: NORMAL
SL AMB POCT URINE PROTEIN: NORMAL

## 2024-02-05 PROCEDURE — 81002 URINALYSIS NONAUTO W/O SCOPE: CPT

## 2024-02-05 PROCEDURE — PNV

## 2024-02-05 NOTE — PATIENT INSTRUCTIONS
Pregnancy at 35 to 38 Weeks   AMBULATORY CARE:   Changes happening with your body:  You are considered full term at the beginning of 37 weeks. Your breathing may be easier if your baby has moved down into a head-down position. You may need to urinate more often because the baby may be pressing on your bladder. You may also feel more discomfort and get tired easily.   Seek care immediately if:   You develop a severe headache that does not go away.    You have new or increased vision changes, such as blurred or spotted vision.    You have new or increased swelling in your face or hands.    You have vaginal spotting or bleeding.    Your water broke or you feel warm water gushing or trickling from your vagina.    Call your obstetrician if:   You have more than 5 contractions in 1 hour.    You notice any changes in your baby's movements.    You have abdominal cramps, pressure, or tightening.    You have a change in vaginal discharge.    You have chills or a fever.    You have vaginal itching, burning, or pain.    You have yellow, green, white, or foul-smelling vaginal discharge.    You have pain or burning when you urinate, less urine than usual, or pink or bloody urine.    You have questions or concerns about your condition or care.    How to care for yourself at this stage of your pregnancy:       Eat a variety of healthy foods.  Healthy foods include fruits, vegetables, whole-grain breads, low-fat dairy foods, beans, lean meats, and fish. Drink liquids as directed. Ask how much liquid to drink each day and which liquids are best for you. Limit caffeine to less than 200 milligrams each day. Limit your intake of fish to 2 servings each week. Choose fish low in mercury such as canned light tuna, shrimp, salmon, cod, or tilapia. Do not  eat fish high in mercury such as swordfish, tilefish, bella mackerel, and shark.         Take prenatal vitamins as directed.  Your need for certain vitamins and minerals, such as folic  acid, increases during pregnancy. Prenatal vitamins provide some of the extra vitamins and minerals you need. Prenatal vitamins may also help to decrease the risk of certain birth defects.         Rest as needed.  Put your feet up if you have swelling in your ankles and feet.         Talk to your healthcare provider about exercise.  Moderate exercise can help you stay fit. Your healthcare provider will help you plan an exercise program that is safe for you during pregnancy.         Do not smoke.  Smoking increases your risk of a miscarriage and other health problems during your pregnancy. Smoking can cause your baby to be born early or weigh less at birth. Ask your healthcare provider for information if you need help quitting.    Do not drink alcohol.  Alcohol passes from your body to your baby through the placenta. It can affect your baby's brain development and cause fetal alcohol syndrome (FAS). FAS is a group of conditions that causes mental, behavior, and growth problems.    Talk to your healthcare provider before you take any medicines.  Many medicines may harm your baby if you take them when you are pregnant. Do not take any medicines, vitamins, herbs, or supplements without first talking to your healthcare provider. Never use illegal or street drugs (such as marijuana or cocaine) while you are pregnant.  Safety tips during pregnancy:   Avoid hot tubs and saunas.  Do not use a hot tub or sauna while you are pregnant, especially during your first trimester. Hot tubs and saunas may raise your baby's temperature and increase the risk of birth defects.    Avoid toxoplasmosis.  This is an infection caused by eating raw meat or being around infected cat feces. It can cause birth defects, miscarriages, and other problems. Wash your hands after you touch raw meat. Make sure any meat is well-cooked before you eat it. Avoid raw eggs and unpasteurized milk. Use gloves or ask someone else to clean your cat's litter box  while you are pregnant.         Ask your healthcare provider about travel.  The most comfortable time to travel is during the second trimester. Ask your provider if you can travel after 36 weeks. You may not be able to travel in an airplane after 36 weeks. He or she may also recommend you avoid long road trips.    Changes happening with your baby:  By 38 weeks, your baby may weigh between 6 and 9 pounds. Your baby may be about 14 inches long from the top of the head to the rump (baby's bottom). Your baby hears well enough to know your voice. As your baby gets larger, you may feel fewer kicks and more stretching and rolling. Your baby may move into a head-down position. Your baby will also rest lower in your abdomen.  What you need to know about prenatal care:  Your healthcare provider will check your blood pressure and weight. You may also need the following:  A urine test  may also be done to check for sugar and protein. These can be signs of gestational diabetes or infection. Protein in your urine may also be a sign of preeclampsia. Preeclampsia is a condition that can develop during week 20 or later of your pregnancy. It causes high blood pressure, and it can cause problems with your kidneys and other organs.    A gestational diabetes screen  may be done. Your healthcare provider may order either a 1-step or 2-step oral glucose tolerance test (OGTT).     1-step OGTT:  Your blood sugar level will be tested after you have not eaten for 8 hours (fasting). You will then be given a glucose drink. Your level will be tested again 1 hour and 2 hours after you finish the drink.    2-step OGTT:  You do not have to fast for the first part of the test. You will have the glucose drink at any time of day. Your blood sugar level will be checked 1 hour later. If your blood sugar is higher than a certain level, another test will be ordered. You will fast and your blood sugar level will be tested. You will have the glucose drink.  Your blood will be tested again 1 hour, 2 hours, and 3 hours after you finish the glucose drink.    A blood test  may be done to check for anemia (low iron level).    A Tdap vaccine  may be recommended by your healthcare provider.    A group B strep test  is a test that is done to check for group B strep infection. Group B strep is a type of bacteria that may be found in the vagina or rectum. It can be passed to your baby during delivery if you have it. Your healthcare provider will take swab your vagina or rectum and send the sample to the lab for tests.    Fundal height  is a measurement of your uterus to check your baby's growth. This number is usually the same as the number of weeks that you have been pregnant. Your healthcare provider may also check your baby's position.    Your baby's heart rate  will be checked.    Follow up with your obstetrician as directed:  Write down your questions so you remember to ask them during your visits.  © Copyright [employer] 2023 Information is for End User's use only and may not be sold, redistributed or otherwise used for commercial purposes.  The above information is an  only. It is not intended as medical advice for individual conditions or treatments. Talk to your doctor, nurse or pharmacist before following any medical regimen to see if it is safe and effective for you.  Early Labor Signs   WHAT YOU NEED TO KNOW:   What are early labor signs?  Early labor signs are the changes in your body that signal your baby is getting ready to be delivered. Early labor signs can happen weeks, days or hours before delivery.   What are the signs and symptoms of early labor?   Lightening  occurs when your baby drops inside your pelvis. You may feel increased pressure in your pelvis. This may happen a few weeks to a few hours before your labor begins.    Contractions  are cramps and tightening that occur in your uterus to help move the baby through your birth canal.  Contractions occur regularly and more often each time. Each one lasts about 30 to 70 seconds, and gets stronger until you deliver your baby. Contractions do not go away with movement. The pain usually starts in your lower back and moves to your abdomen.     Effacement  occurs when your cervix softens and thins, so it can easily open for the baby. You will not be able to feel effacement. Your healthcare provider will examine your cervix for effacement.     Dilation  is widening of your cervix. Your healthcare provider will examine your cervix for dilation. Your cervix may start to dilate weeks before your baby is delivered. Your cervix will be fully opened and ready for delivery when it is dilated to 10 centimeters.     Increased discharge  from your vagina may occur. It may be brown, pink, clear, or slightly bloody. This discharge may also be called bloody show. Bloody show is a mucus plug that forms and blocks your cervix during pregnancy. The discharge may mean that your cervix is opening up and getting ready for delivery.    Rupture of membranes  is a sudden release of clear fluid from your vagina. Ruptured membranes means your water broke. Your healthcare provider may need to break your water if it does not happen on its own.    What is false labor?  You may have false labor signs, which are also called [place] contractions. False labor is common and may happen several weeks or days before your actual labor. The contractions are not regular, and do not get closer together. The pain is usually mild, does not worsen, and is felt only in front. [place] contractions may happen later in the day, and stop after you change position, walk, or rest.  When should I call 911?   You have heavy vaginal bleeding.    You cannot get to the hospital before the baby starts to come out.    When should I seek immediate care?   You have regular, painful contractions that are less than 5 minutes apart and last 30 to 70  seconds each.    You have a constant trickle or sudden gush of clear fluid from your vagina.    You notice a sudden decrease in your baby's movement.    When should I contact my healthcare provider?   You have pain in your lower back or abdomen that does not get better when you change positions.    You have bloody mucus or show.    You have questions or concerns about your condition or care.    CARE AGREEMENT:   You have the right to help plan your care. Learn about your health condition and how it may be treated. Discuss treatment options with your healthcare providers to decide what care you want to receive. You always have the right to refuse treatment. The above information is an  only. It is not intended as medical advice for individual conditions or treatments. Talk to your doctor, nurse or pharmacist before following any medical regimen to see if it is safe and effective for you.  © Copyright [employer] 2023 Information is for End User's use only and may not be sold, redistributed or otherwise used for commercial purposes.

## 2024-02-05 NOTE — PROGRESS NOTES
Problem   38 Weeks Gestation of Pregnancy    Blood Type: O+      Pap  Neg    GC/CT  Neg  PN1 Labs:     H&H: 12.1/34.6  28 Week Labs:NML  AFP: Neg   Level 1:completed     Tdap:23  Flu: declines again  GBS swab: GBS bacteruria    Blue folder: given     Breast pump order: ordered  L&D forms: to be submitted next visit, still working on them    Delivery consent: signed  IOL:            38 weeks gestation of pregnancy  Winnie Lopez is a 29 y.o.   38w1d who presents for routine PNV.  28 week labs reviewed: nml  Prepregnancy BMI 20.93 with a goal weight gain 11.5 kg (25 lb)-16 kg (35 lb). TWG 13.4 kg (29 lb 9.6 oz)  Denies OB complaints. Good fetal movement. Denies CTX/LOF/VB.    Reviewed  labor precautions and FKCs.   Advised to start Perineal massage at 34-36 weeks   Pregnancy Essential guide and Baby and Me web site recommended

## 2024-02-05 NOTE — ASSESSMENT & PLAN NOTE
Winnie Lopez is a 29 y.o.   38w1d who presents for routine PNV.  28 week labs reviewed: nml  Prepregnancy BMI 20.93 with a goal weight gain 11.5 kg (25 lb)-16 kg (35 lb). TWG 13.4 kg (29 lb 9.6 oz)  Denies OB complaints. Good fetal movement. Denies CTX/LOF/VB.    Declined cervix check today.   Labor precautions reviewed.   Perineal massages encouraged.   RTO in 1 week

## 2024-02-05 NOTE — PROGRESS NOTES
Prenatal Visit 38w1d    Patient denies any lof, vb or ctx. Patient has +fm.  Patient urine is neg/neg  Tdap utd  GBS +  Delivery consent signed  Patient will wait until after her due date to discuss induction.  Patient declined cervical check.

## 2024-02-10 ENCOUNTER — CLINICAL SUPPORT (OUTPATIENT)
Dept: POSTPARTUM | Facility: CLINIC | Age: 29
End: 2024-02-10

## 2024-02-10 DIAGNOSIS — Z32.2 ENCOUNTER FOR CHILDBIRTH INSTRUCTION: Primary | ICD-10-CM

## 2024-02-14 ENCOUNTER — TELEPHONE (OUTPATIENT)
Dept: OBGYN CLINIC | Facility: CLINIC | Age: 29
End: 2024-02-14

## 2024-02-14 ENCOUNTER — ROUTINE PRENATAL (OUTPATIENT)
Age: 29
End: 2024-02-14
Payer: COMMERCIAL

## 2024-02-14 VITALS
DIASTOLIC BLOOD PRESSURE: 70 MMHG | SYSTOLIC BLOOD PRESSURE: 100 MMHG | BODY MASS INDEX: 26.46 KG/M2 | HEART RATE: 77 BPM | HEIGHT: 64 IN | WEIGHT: 155 LBS | OXYGEN SATURATION: 99 %

## 2024-02-14 DIAGNOSIS — Z34.83 PRENATAL CARE, SUBSEQUENT PREGNANCY IN THIRD TRIMESTER: Primary | ICD-10-CM

## 2024-02-14 DIAGNOSIS — Z3A.39 39 WEEKS GESTATION OF PREGNANCY: ICD-10-CM

## 2024-02-14 LAB
SL AMB  POCT GLUCOSE, UA: NEGATIVE
SL AMB POCT URINE PROTEIN: NEGATIVE

## 2024-02-14 PROCEDURE — PNV

## 2024-02-14 PROCEDURE — 81002 URINALYSIS NONAUTO W/O SCOPE: CPT

## 2024-02-14 NOTE — PATIENT INSTRUCTIONS
Pregnancy at 39 to 40 Weeks   AMBULATORY CARE:   Changes happening with your body:  You are now getting close to your due date. Your due date is just an estimate of when your baby will be born. Your baby may be born before or after your due date. Your breathing may be easier if your baby has moved down into a head-down position. You may need to urinate more often because the baby may be pressing on your bladder. You may also feel more discomfort and tire easily. You may also be having trouble sleeping.  Seek care immediately if:   You develop a severe headache that does not go away.    You have new or increased vision changes, such as blurred or spotted vision.    You have new or increased swelling in your face or hands.    You have vaginal spotting or bleeding.    Your water broke or you feel warm water gushing or trickling from your vagina.    Call your obstetrician if:   You have more than 5 contractions in 1 hour.    You notice any changes in your baby's movements.    You have abdominal cramps, pressure, or tightening.    You have a change in vaginal discharge.    You have chills or a fever.    You have vaginal itching, burning, or pain.    You have yellow, green, white, or foul-smelling vaginal discharge.    You have pain or burning when you urinate, less urine than usual, or pink or bloody urine.    You have questions or concerns about your condition or care.    How to care for yourself at this stage of your pregnancy:       Eat a variety of healthy foods.  Healthy foods include fruits, vegetables, whole-grain breads, low-fat dairy foods, beans, lean meats, and fish. Drink liquids as directed. Ask how much liquid to drink each day and which liquids are best for you. Limit caffeine to less than 200 milligrams each day. Limit your intake of fish to 2 servings each week. Choose fish low in mercury such as canned light tuna, shrimp, salmon, cod, or tilapia. Do not  eat fish high in mercury such as swordfish,  tilefish, bella mackerel, and shark.         Take prenatal vitamins as directed.  Your need for certain vitamins and minerals, such as folic acid, increases during pregnancy. Prenatal vitamins provide some of the extra vitamins and minerals you need. Prenatal vitamins may also help to decrease the risk of certain birth defects.         Rest as needed.  Put your feet up if you have swelling in your ankles and feet.         Talk to your healthcare provider about exercise.  Moderate exercise can help you stay fit. Your healthcare provider will help you plan an exercise program that is safe for you during pregnancy.         Do not smoke.  Smoking increases your risk of a miscarriage and other health problems during your pregnancy. Smoking can cause your baby to be born early or weigh less at birth. Ask your healthcare provider for information if you need help quitting.    Do not drink alcohol.  Alcohol passes from your body to your baby through the placenta. It can affect your baby's brain development and cause fetal alcohol syndrome (FAS). FAS is a group of conditions that causes mental, behavior, and growth problems.    Talk to your healthcare provider before you take any medicines.  Many medicines may harm your baby if you take them when you are pregnant. Do not take any medicines, vitamins, herbs, or supplements without first talking to your healthcare provider. Never use illegal or street drugs (such as marijuana or cocaine) while you are pregnant.  Safety tips during pregnancy:   Avoid hot tubs and saunas.  Do not use a hot tub or sauna while you are pregnant, especially during your first trimester. Hot tubs and saunas may raise your baby's temperature and increase the risk of birth defects.    Avoid toxoplasmosis.  This is an infection caused by eating raw meat or being around infected cat feces. It can cause birth defects, miscarriages, and other problems. Wash your hands after you touch raw meat. Make sure any  meat is well-cooked before you eat it. Avoid raw eggs and unpasteurized milk. Use gloves or ask someone else to clean your cat's litter box while you are pregnant.         Ask your healthcare provider about travel.  The most comfortable time to travel is during the second trimester. Ask your provider if you can travel after 36 weeks. You may not be able to travel in an airplane after 36 weeks. He or she may also recommend that you avoid long road trips.    Changes happening with your baby:  Your baby is ready to be born. At birth, your baby may weigh about 6 to 9 pounds and be about 19 to 21 inches long. Your baby may be in a head-down position. Your baby will also rest lower in your abdomen.  What you need to know about prenatal care:  Your healthcare provider will check your blood pressure and weight. You may also need the following:  A urine test  may also be done to check for sugar and protein. These can be signs of gestational diabetes or infection. Protein in your urine may also be a sign of preeclampsia. Preeclampsia is a condition that can develop during week 20 or later of your pregnancy. It causes high blood pressure, and it can cause problems with your kidneys and other organs.    A gestational diabetes screen  may be done. Your healthcare provider may order either a 1-step or 2-step oral glucose tolerance test (OGTT).     1-step OGTT:  Your blood sugar level will be tested after you have not eaten for 8 hours (fasting). You will then be given a glucose drink. Your level will be tested again 1 hour and 2 hours after you finish the drink.    2-step OGTT:  You do not have to fast for the first part of the test. You will have the glucose drink at any time of day. Your blood sugar level will be checked 1 hour later. If your blood sugar is higher than a certain level, another test will be ordered. You will fast and your blood sugar level will be tested. You will have the glucose drink. Your blood will be  tested again 1 hour, 2 hours, and 3 hours after you finish the glucose drink.    Your baby's heart rate  will be checked.    Follow up with your obstetrician as directed:  Write down your questions so you remember to ask them during your visits.  © Copyright [employer] 2023 Information is for End User's use only and may not be sold, redistributed or otherwise used for commercial purposes.  The above information is an  only. It is not intended as medical advice for individual conditions or treatments. Talk to your doctor, nurse or pharmacist before following any medical regimen to see if it is safe and effective for you.  Early Labor Signs   WHAT YOU NEED TO KNOW:   What are early labor signs?  Early labor signs are the changes in your body that signal your baby is getting ready to be delivered. Early labor signs can happen weeks, days or hours before delivery.   What are the signs and symptoms of early labor?   Lightening  occurs when your baby drops inside your pelvis. You may feel increased pressure in your pelvis. This may happen a few weeks to a few hours before your labor begins.    Contractions  are cramps and tightening that occur in your uterus to help move the baby through your birth canal. Contractions occur regularly and more often each time. Each one lasts about 30 to 70 seconds, and gets stronger until you deliver your baby. Contractions do not go away with movement. The pain usually starts in your lower back and moves to your abdomen.     Effacement  occurs when your cervix softens and thins, so it can easily open for the baby. You will not be able to feel effacement. Your healthcare provider will examine your cervix for effacement.     Dilation  is widening of your cervix. Your healthcare provider will examine your cervix for dilation. Your cervix may start to dilate weeks before your baby is delivered. Your cervix will be fully opened and ready for delivery when it is dilated to 10  centimeters.     Increased discharge  from your vagina may occur. It may be brown, pink, clear, or slightly bloody. This discharge may also be called bloody show. Bloody show is a mucus plug that forms and blocks your cervix during pregnancy. The discharge may mean that your cervix is opening up and getting ready for delivery.    Rupture of membranes  is a sudden release of clear fluid from your vagina. Ruptured membranes means your water broke. Your healthcare provider may need to break your water if it does not happen on its own.    What is false labor?  You may have false labor signs, which are also called [place] contractions. False labor is common and may happen several weeks or days before your actual labor. The contractions are not regular, and do not get closer together. The pain is usually mild, does not worsen, and is felt only in front. [place] contractions may happen later in the day, and stop after you change position, walk, or rest.  When should I call 911?   You have heavy vaginal bleeding.    You cannot get to the hospital before the baby starts to come out.    When should I seek immediate care?   You have regular, painful contractions that are less than 5 minutes apart and last 30 to 70 seconds each.    You have a constant trickle or sudden gush of clear fluid from your vagina.    You notice a sudden decrease in your baby's movement.    When should I contact my healthcare provider?   You have pain in your lower back or abdomen that does not get better when you change positions.    You have bloody mucus or show.    You have questions or concerns about your condition or care.    CARE AGREEMENT:   You have the right to help plan your care. Learn about your health condition and how it may be treated. Discuss treatment options with your healthcare providers to decide what care you want to receive. You always have the right to refuse treatment. The above information is an  only.  It is not intended as medical advice for individual conditions or treatments. Talk to your doctor, nurse or pharmacist before following any medical regimen to see if it is safe and effective for you.  © Copyright [employer] 2023 Information is for End User's use only and may not be sold, redistributed or otherwise used for commercial purposes.  Induction of Labor   WHAT YOU NEED TO KNOW:   What is induction of labor?  Induction of labor is a procedure to induce (start) your labor before it begins on its own. Medicines and other methods are used to start contractions and help your cervix soften, thin (efface), and dilate (open). You may be given antibiotics to fight a bacterial infection you have or prevent an infection during delivery.  Why might I need induction of labor?   A health problem you have, such as high blood pressure or diabetes    A health problem your baby has, such as a slow heartbeat or poor growth inside the womb     Problems related to your pregnancy, such as infection of the amniotic fluid, your water breaks before labor begins, or you have too little amniotic fluid    What happens during induction of labor?  Your healthcare provider may use one or more of the following to induce labor:  Cervical ripening  is a process that helps to soften and thin out your cervix. Medicines called prostaglandins may be used to ripen your cervix. These medicines can be inserted into your vagina or taken as a pill. Other methods can also be used to dilate the cervix. This includes a catheter with an inflatable balloon on the end that is inserted into your cervix. Saline injected through the catheter helps the balloon to expand. A substance that absorbs water may also be inserted into your cervix to help dilate it.     Stripping the membranes  is a procedure that causes your body to release prostaglandins naturally. Prostaglandins soften the cervix and may help to cause contractions. Your healthcare provider will  sweep a gloved finger over the membranes that connect the amniotic sac to the uterus wall.     Rupturing the amniotic sac  is a procedure that is used to cause your water to break. Your healthcare provider will use a small tool to make a hole in your amniotic sac. This may help contractions to start.     Oxytocin  may be given through an IV to cause contractions to start and stay strong and regular.    What are the risks of induction of labor?  Medicines used to induce labor may cause too many contractions. This can lower your baby's heartbeat and decrease his or her oxygen supply. Induction of labor also increases the risk of umbilical cord prolapse. This condition causes the umbilical cord to slip back into the vagina before delivery. It can compress the cord and decrease your baby's oxygen supply. Medical induction may cause an infection in you or your baby. Medical induction may also increase your risk for a  section (), especially if it is the first time you give birth. Your uterus may rupture if you have had a  before.  CARE AGREEMENT:   You have the right to help plan your care. Learn about your health condition and how it may be treated. Discuss treatment options with your healthcare providers to decide what care you want to receive. You always have the right to refuse treatment. The above information is an  only. It is not intended as medical advice for individual conditions or treatments. Talk to your doctor, nurse or pharmacist before following any medical regimen to see if it is safe and effective for you.  ©  Mer2023 Information is for End User's use only and may not be sold, redistributed or otherwise used for commercial purposes.

## 2024-02-14 NOTE — ASSESSMENT & PLAN NOTE
Winnie Lopez is a 29 y.o.   39w3d who presents for routine PNV.  28 week labs reviewed: nml  Prepregnancy BMI 20.93 with a goal weight gain 11.5 kg (25 lb)-16 kg (35 lb). TWG 15 kg (33 lb)  Denies OB complaints. Good fetal movement. Denies CTX/LOF/VB. Occasional BH contractions.     Cervix check today. Declined eIOL, agrees to 41 week induction. Consent reviewed and signed.   Reviewed labor precautions and FKCs.   Encouraged perineal massages.   RTO in 1 week

## 2024-02-14 NOTE — TELEPHONE ENCOUNTER
----- Message from DON Collazo sent at 2/14/2024  8:26 AM EST -----  Regarding: IOL  Procedure to be scheduled: IOL   EJD: 2/18/24  Cervical dialtion: 0/70/-2  Indication for delivery: 41 weeks  Requested date (s) of delivery: 2/23/24  and onwards   If requested date is unavailable, is there a date by which the pt must be delivered?   Physician preference: none       If IOL, anticipated method: cytotec/robbins

## 2024-02-14 NOTE — PROGRESS NOTES
Problem   39 Weeks Gestation of Pregnancy    Blood Type: O+      Pap  Neg    GC/CT  Neg  PN1 Labs:     H&H: 12.1/34.6  28 Week Labs:NML  AFP: Neg   Level 1:completed     Tdap:23  Flu: declines again  GBS swab: GBS bacteruria    Blue folder: given     Breast pump order: ordered  L&D forms: to be submitted next visit, still working on them    Delivery consent: signed  IOL: 41 weeks            39 weeks gestation of pregnancy  Winnie Lopez is a 29 y.o.   39w3d who presents for routine PNV.  28 week labs reviewed: nml  Prepregnancy BMI 20.93 with a goal weight gain 11.5 kg (25 lb)-16 kg (35 lb). TWG 15 kg (33 lb)  Denies OB complaints. Good fetal movement. Denies CTX/LOF/VB. Occasional BH contractions.     Cervix check today. Declined eIOL, agrees to 41 week induction. Consent reviewed and signed.   Reviewed labor precautions and FKCs.   Encouraged perineal massages.   RTO in 1 week

## 2024-02-16 NOTE — TELEPHONE ENCOUNTER
2/23 8pm NP     Left message     Patient notified of IOL date,time,and location. Advised patient she may eat a light breakfast/dinner prior to going to L&D. In the interim please report any vaginal bleeding,leakage of fluid,decreased fetal movement or contractions.Reviewed fetal kick counts. Advised to keep all upcoming prenatal visits.

## 2024-02-19 ENCOUNTER — CLINICAL SUPPORT (OUTPATIENT)
Dept: POSTPARTUM | Facility: CLINIC | Age: 29
End: 2024-02-19

## 2024-02-19 DIAGNOSIS — Z32.2 ENCOUNTER FOR CHILDBIRTH INSTRUCTION: Primary | ICD-10-CM

## 2024-02-20 ENCOUNTER — NURSE TRIAGE (OUTPATIENT)
Age: 29
End: 2024-02-20

## 2024-02-20 ENCOUNTER — HOSPITAL ENCOUNTER (OUTPATIENT)
Facility: HOSPITAL | Age: 29
Discharge: HOME | DRG: 833 | End: 2024-02-20
Attending: OBSTETRICS & GYNECOLOGY | Admitting: OBSTETRICS & GYNECOLOGY
Payer: COMMERCIAL

## 2024-02-20 VITALS — SYSTOLIC BLOOD PRESSURE: 120 MMHG | DIASTOLIC BLOOD PRESSURE: 75 MMHG | TEMPERATURE: 98.2 F | HEART RATE: 93 BPM

## 2024-02-20 DIAGNOSIS — Z3A.40 40 WEEKS GESTATION OF PREGNANCY: ICD-10-CM

## 2024-02-20 DIAGNOSIS — Z3A.39 39 WEEKS GESTATION OF PREGNANCY: Primary | ICD-10-CM

## 2024-02-20 PROBLEM — O42.90 PREMATURE RUPTURE OF MEMBRANES: Status: ACTIVE | Noted: 2024-02-20

## 2024-02-20 PROCEDURE — NC001 PR NO CHARGE: Performed by: OBSTETRICS & GYNECOLOGY

## 2024-02-20 PROCEDURE — 99213 OFFICE O/P EST LOW 20 MIN: CPT

## 2024-02-20 RX ORDER — BUPIVACAINE HYDROCHLORIDE 2.5 MG/ML
30 INJECTION, SOLUTION EPIDURAL; INFILTRATION; INTRACAUDAL ONCE AS NEEDED
Status: DISCONTINUED | OUTPATIENT
Start: 2024-02-20 | End: 2024-02-20

## 2024-02-20 RX ORDER — ACETAMINOPHEN 325 MG/1
975 TABLET ORAL EVERY 6 HOURS PRN
Status: DISCONTINUED | OUTPATIENT
Start: 2024-02-20 | End: 2024-02-20

## 2024-02-20 RX ORDER — SODIUM CHLORIDE, SODIUM LACTATE, POTASSIUM CHLORIDE, CALCIUM CHLORIDE 600; 310; 30; 20 MG/100ML; MG/100ML; MG/100ML; MG/100ML
125 INJECTION, SOLUTION INTRAVENOUS CONTINUOUS
Status: DISCONTINUED | OUTPATIENT
Start: 2024-02-20 | End: 2024-02-20

## 2024-02-20 RX ORDER — CALCIUM CARBONATE 500 MG/1
1000 TABLET, CHEWABLE ORAL 3 TIMES DAILY PRN
Status: DISCONTINUED | OUTPATIENT
Start: 2024-02-20 | End: 2024-02-20

## 2024-02-20 RX ORDER — ONDANSETRON 2 MG/ML
4 INJECTION INTRAMUSCULAR; INTRAVENOUS EVERY 6 HOURS PRN
Status: DISCONTINUED | OUTPATIENT
Start: 2024-02-20 | End: 2024-02-20

## 2024-02-20 NOTE — PROGRESS NOTES
Obstetrics Triage  Winnie Almaraz Lopez 29 y.o. female MRN: [id number]  Unit/Bed#: LD TRIAGE 2 Encounter: [id number]      Assessment/Plan:  29 y.o.  at 40w2d with irregular contractions, not yet in labor. No evidence of rupture of membranes. Normotensive with reactive NST. Coping mechanisms for contractions reviewed.    Discharge instructions  Patient instructed to call if experiencing worsening contractions, vaginal bleeding, loss of fluid or decreased fetal movement.    She will follow up on 24 for induction of labor    Plan of care discussed with Dr. Goldberg.    Subjective:  Estimated Date of Delivery: 24    HPI Chronology:  29 y.o.  at 40w2d presents for labor check. Having irregular contractions since last night that kept her from sleeping. Comfortable between contractions. Ongoing mucus discharge as well but no significant loss of fluid. Feeling regular fetal movement.    Pregnancy notable for GBS bacteriuria on initial labs.    Review of Systems  12-point ROS negative unless stated in the HPI.    Objective:  Vitals:   /75   Pulse 93   Temp 98.2 °F (36.8 °C) (Oral)   LMP 2023   There is no height or weight on file to calculate BMI.    Physical Exam  GEN:  alert and oriented x 3, no apparent distress, appears well  PULMONARY: normal effort  ABDOMEN: gravid, soft, no tenderness  GENITOURINARY: normal external female genitalia. No pooling noted. Cervix visually closed. Swab of posterior fornix nitrazine negative    FETAL ASSESSMENT:  FHT: Baseline Rate (FHR): 140 bpm  Variability: Moderate  Accelerations: 15 x 15 or greater, At variable times  Decelerations: None  FHR Category: Category I  TOCO: Contraction Frequency (minutes): Occasional  Contraction Duration (seconds):   Contraction Intensity: Mild    Microscopy Slides: negative for ferning.     Lab, Imaging and other studies: I have personally reviewed pertinent reports.      Kathryn Guthrie MD  PGY-IV,  OB/GYN  2/20/2024, 12:28 PM

## 2024-02-20 NOTE — TELEPHONE ENCOUNTER
"Patient called into office with c/o cramping and contractions since Sunday.  Started off irregular but have gotten to 5-6 minutes.  She states she has +FM, no bleeding.  She did state she lost her mucus plug as well on Sunday.      Advised patient to go to L&D [facility] for evaluation.  TT sent to provider on call and L&D Charge nurse    Reason for Disposition   First baby (primipara) with contractions < 6 minutes apart, and present 2 hours    Answer Assessment - Initial Assessment Questions  1. ONSET: \"When did the symptoms begin?\"         Sunday    2. CONTRACTIONS: \"Describe the contractions that you are having.\" (e.g., duration, frequency, regularity, severity)      Irregular, sometimes 5 minute apart    3. JED: \"What date are you expecting to deliver?\"      02/18/2024    4. PARITY: \"Have you had a baby before?\" If Yes, ask: \"How long did the labor last?\"      First baby    5. FETAL MOVEMENT: \"Has the baby's movement decreased or changed significantly from normal?\"  Positive fetal movement    6. OTHER SYMPTOMS: \"Do you have any other symptoms?\" (e.g., leaking fluid from vagina, vaginal bleeding, fever, hand/facial swelling)      Mucous    Protocols used: Pregnancy - Labor-ADULT-[place]    "

## 2024-02-21 NOTE — UTILIZATION REVIEW
Initial Clinical Review    Admission: Date/Time/Statement:   Admission Orders (From admission, onward)       Ordered        24 1214  Inpatient Admission  Once                          Orders Placed This Encounter   Procedures    Inpatient Admission     Standing Status:   Standing     Number of Occurrences:   1     Order Specific Question:   Level of Care     Answer:   Med Surg [16]     Order Specific Question:   Estimated length of stay     Answer:   More than 2 Midnights     Order Specific Question:   Certification     Answer:   I certify that inpatient services are medically necessary for this patient for a duration of greater than two midnights. See H&P and MD Progress Notes for additional information about the patient's course of treatment.          Chief Complaint   Patient presents with    Contractions     Q 10 min       Initial Presentation: 29 y.o. female presented to L&D as inpatient admission for irregular contraction with possible ROM.  29 y.o.  at 40w2d presents for labor check. Having irregular contractions since last night that kept her from sleeping. Comfortable between contractions. Ongoing mucus discharge as well but no significant loss of fluid. Feeling regular fetal movement.: normal external female genitalia. No pooling noted. Cervix visually closed. Swab of posterior fornix nitrazine negative NST reactive. Patient d/c to home       FETAL ASSESSMENT:  FHT: Baseline Rate (FHR): 140 bpm  Variability: Moderate  Accelerations: 15 x 15 or greater, At variable times  Decelerations: None  FHR Category: Category I  TOCO: Contraction Frequency (minutes): Occasional  Contraction Duration (seconds):   Contraction Intensity: Mild      Admitting  Vitals   Temperature Pulse Resp Blood Pressure SpO2   24 1139 24 1140 -- 24 1140 --   98.2 °F (36.8 °C) 93  120/75       Temp Source Heart Rate Source Patient Position - Orthostatic VS BP Location FiO2 (%)   24 1139 -- -- --  --   Oral          Pain Score       02/20/24 1139       6          Wt Readings from Last 1 Encounters:   02/14/24 70.3 kg (155 lb)       Pertinent Labs/Diagnostic Test Results:       No past medical history on file.  Present on Admission:   GBS bacteriuria      Admitting Diagnosis: Uterine contractions at greater than 20 weeks of gestation [O47.9]  40 weeks gestation of pregnancy [Z3A.40]  Age/Sex: 29 y.o. female  Admission Orders:  Scheduled Medications:  No current facility-administered medications for this encounter.    Continuous IV Infusions:  No current facility-administered medications for this encounter.    PRN Meds:  No current facility-administered medications for this encounter.      None    Network Utilization Review Department  ATTENTION: Please call with any questions or concerns to [phone] and carefully listen to the prompts so that you are directed to the right person. All voicemails are confidential.   For Discharge needs, contact Care Management DC Support Team at [phone] opt. 2  Send all requests for admission clinical reviews, approved or denied determinations and any other requests to dedicated fax number below belonging to the [place] where the patient is receiving treatment. List of dedicated fax numbers for the Facilities:  FACILITY NAME UR FAX NUMBER   ADMISSION DENIALS (Administrative/Medical Necessity) [phone]   DISCHARGE SUPPORT TEAM (NETWORK) [phone]   PARENT CHILD HEALTH (Maternity/NICU/Pediatrics) [phone]   [facility] [phone]   [facility] [phone]   [facility] [phone]   [facility] [phone]   [facility] [phone]   [facility] [phone]   [facility] [phone]   [facility] [phone]   [facility]  [facility] [place] [phone]   [facility] [phone]   [facility] [phone]   [facility] [phone]

## 2024-02-22 ENCOUNTER — HOSPITAL ENCOUNTER (INPATIENT)
Facility: HOSPITAL | Age: 29
LOS: 2 days | Discharge: HOME | End: 2024-02-24
Attending: OBSTETRICS & GYNECOLOGY | Admitting: OBSTETRICS & GYNECOLOGY
Payer: COMMERCIAL

## 2024-02-22 ENCOUNTER — ANESTHESIA EVENT (INPATIENT)
Dept: ANESTHESIOLOGY | Facility: HOSPITAL | Age: 29
End: 2024-02-22
Payer: COMMERCIAL

## 2024-02-22 ENCOUNTER — ANESTHESIA (INPATIENT)
Dept: ANESTHESIOLOGY | Facility: HOSPITAL | Age: 29
End: 2024-02-22
Payer: COMMERCIAL

## 2024-02-22 DIAGNOSIS — Z3A.40 40 WEEKS GESTATION OF PREGNANCY: Primary | ICD-10-CM

## 2024-02-22 PROBLEM — Z37.9 NORMAL LABOR: Status: ACTIVE | Noted: 2024-02-22

## 2024-02-22 LAB
ABO GROUP BLD: NORMAL
BASE EXCESS BLDCOA CALC-SCNC: -7.7 MMOL/L (ref 3–11)
BASE EXCESS BLDCOV CALC-SCNC: -4.9 MMOL/L (ref 1–9)
BLD GP AB SCN SERPL QL: NEGATIVE
ERYTHROCYTE [DISTWIDTH] IN BLOOD BY AUTOMATED COUNT: 13.4 % (ref 11.6–15.1)
EXTERNAL GROUP B STREP ANTIGEN: POSITIVE
HCO3 BLDCOA-SCNC: 20.4 MMOL/L (ref 17.3–27.3)
HCO3 BLDCOV-SCNC: 18.9 MMOL/L (ref 12.2–28.6)
HCT VFR BLD AUTO: 36.3 % (ref 34.8–46.1)
HGB BLD-MCNC: 12.2 G/DL (ref 11.5–15.4)
HOLD SPECIMEN: NORMAL
MCH RBC QN AUTO: 30.7 PG (ref 26.8–34.3)
MCHC RBC AUTO-ENTMCNC: 33.6 G/DL (ref 31.4–37.4)
MCV RBC AUTO: 91 FL (ref 82–98)
O2 CT VFR BLDCOA CALC: 6.9 ML/DL
OXYHGB MFR BLDCOA: 31.6 %
OXYHGB MFR BLDCOV: 79.8 %
PCO2 BLDCOA: 51.3 MM[HG] (ref 30–60)
PCO2 BLDCOV: 32.5 MM HG (ref 27–43)
PH BLDCOA: 7.22 [PH] (ref 7.23–7.43)
PH BLDCOV: 7.38 [PH] (ref 7.19–7.49)
PLATELET # BLD AUTO: 297 THOUSANDS/UL (ref 149–390)
PMV BLD AUTO: 9.2 FL (ref 8.9–12.7)
PO2 BLDCOA: 18 MM HG (ref 5–25)
PO2 BLDCOV: 36.8 MM HG (ref 15–45)
RBC # BLD AUTO: 3.97 MILLION/UL (ref 3.81–5.12)
RH BLD: POSITIVE
SAO2 % BLDCOV: 18.3 ML/DL
SPECIMEN EXPIRATION DATE: NORMAL
TREPONEMA PALLIDUM IGG+IGM AB [PRESENCE] IN SERUM OR PLASMA BY IMMUNOASSAY: NORMAL
WBC # BLD AUTO: 15.62 THOUSAND/UL (ref 4.31–10.16)

## 2024-02-22 PROCEDURE — 82805 BLOOD GASES W/O2 SATURATION: CPT | Performed by: OBSTETRICS & GYNECOLOGY

## 2024-02-22 PROCEDURE — NC001 PR NO CHARGE: Performed by: OBSTETRICS & GYNECOLOGY

## 2024-02-22 PROCEDURE — 86900 BLOOD TYPING SEROLOGIC ABO: CPT

## 2024-02-22 PROCEDURE — 10H07YZ INSERTION OF OTHER DEVICE INTO PRODUCTS OF CONCEPTION, VIA NATURAL OR ARTIFICIAL OPENING: ICD-10-PCS | Performed by: OBSTETRICS & GYNECOLOGY

## 2024-02-22 PROCEDURE — 86901 BLOOD TYPING SEROLOGIC RH(D): CPT

## 2024-02-22 PROCEDURE — 86780 TREPONEMA PALLIDUM: CPT

## 2024-02-22 PROCEDURE — 86850 RBC ANTIBODY SCREEN: CPT

## 2024-02-22 PROCEDURE — 99214 OFFICE O/P EST MOD 30 MIN: CPT

## 2024-02-22 PROCEDURE — 85027 COMPLETE CBC AUTOMATED: CPT

## 2024-02-22 PROCEDURE — 4A1HXCZ MONITORING OF PRODUCTS OF CONCEPTION, CARDIAC RATE, EXTERNAL APPROACH: ICD-10-PCS | Performed by: OBSTETRICS & GYNECOLOGY

## 2024-02-22 PROCEDURE — 10907ZC DRAINAGE OF AMNIOTIC FLUID, THERAPEUTIC FROM PRODUCTS OF CONCEPTION, VIA NATURAL OR ARTIFICIAL OPENING: ICD-10-PCS | Performed by: OBSTETRICS & GYNECOLOGY

## 2024-02-22 PROCEDURE — 10H073Z INSERTION OF MONITORING ELECTRODE INTO PRODUCTS OF CONCEPTION, VIA NATURAL OR ARTIFICIAL OPENING: ICD-10-PCS | Performed by: OBSTETRICS & GYNECOLOGY

## 2024-02-22 PROCEDURE — 59409 OBSTETRICAL CARE: CPT | Performed by: OBSTETRICS & GYNECOLOGY

## 2024-02-22 RX ORDER — IBUPROFEN 600 MG/1
600 TABLET ORAL EVERY 6 HOURS
Status: DISCONTINUED | OUTPATIENT
Start: 2024-02-22 | End: 2024-02-24 | Stop reason: HOSPADM

## 2024-02-22 RX ORDER — BUPIVACAINE HYDROCHLORIDE 2.5 MG/ML
30 INJECTION, SOLUTION EPIDURAL; INFILTRATION; INTRACAUDAL ONCE AS NEEDED
Status: DISCONTINUED | OUTPATIENT
Start: 2024-02-22 | End: 2024-02-22

## 2024-02-22 RX ORDER — OXYTOCIN/RINGER'S LACTATE 30/500 ML
1-30 PLASTIC BAG, INJECTION (ML) INTRAVENOUS
Status: DISCONTINUED | OUTPATIENT
Start: 2024-02-22 | End: 2024-02-22

## 2024-02-22 RX ORDER — CALCIUM CARBONATE 500 MG/1
1000 TABLET, CHEWABLE ORAL DAILY PRN
Status: DISCONTINUED | OUTPATIENT
Start: 2024-02-22 | End: 2024-02-24 | Stop reason: HOSPADM

## 2024-02-22 RX ORDER — ONDANSETRON 2 MG/ML
4 INJECTION INTRAMUSCULAR; INTRAVENOUS EVERY 8 HOURS PRN
Status: DISCONTINUED | OUTPATIENT
Start: 2024-02-22 | End: 2024-02-24 | Stop reason: HOSPADM

## 2024-02-22 RX ORDER — DOCUSATE SODIUM 100 MG/1
100 CAPSULE, LIQUID FILLED ORAL 2 TIMES DAILY
Status: DISCONTINUED | OUTPATIENT
Start: 2024-02-22 | End: 2024-02-24 | Stop reason: HOSPADM

## 2024-02-22 RX ORDER — LIDOCAINE HYDROCHLORIDE AND EPINEPHRINE 15; 5 MG/ML; UG/ML
INJECTION, SOLUTION EPIDURAL
Status: COMPLETED | OUTPATIENT
Start: 2024-02-22 | End: 2024-02-22

## 2024-02-22 RX ORDER — DIPHENHYDRAMINE HCL 25 MG
25 TABLET ORAL EVERY 6 HOURS PRN
Status: DISCONTINUED | OUTPATIENT
Start: 2024-02-22 | End: 2024-02-24 | Stop reason: HOSPADM

## 2024-02-22 RX ORDER — ONDANSETRON 2 MG/ML
4 INJECTION INTRAMUSCULAR; INTRAVENOUS EVERY 6 HOURS PRN
Status: DISCONTINUED | OUTPATIENT
Start: 2024-02-22 | End: 2024-02-22

## 2024-02-22 RX ORDER — SODIUM CHLORIDE, SODIUM LACTATE, POTASSIUM CHLORIDE, CALCIUM CHLORIDE 600; 310; 30; 20 MG/100ML; MG/100ML; MG/100ML; MG/100ML
100 INJECTION, SOLUTION INTRAVENOUS CONTINUOUS
Status: DISCONTINUED | OUTPATIENT
Start: 2024-02-22 | End: 2024-02-22

## 2024-02-22 RX ORDER — OXYTOCIN/RINGER'S LACTATE 30/500 ML
250 PLASTIC BAG, INJECTION (ML) INTRAVENOUS CONTINUOUS
Status: ACTIVE | OUTPATIENT
Start: 2024-02-22 | End: 2024-02-22

## 2024-02-22 RX ORDER — ROPIVACAINE HYDROCHLORIDE 2 MG/ML
INJECTION, SOLUTION EPIDURAL; INFILTRATION; PERINEURAL AS NEEDED
Status: DISCONTINUED | OUTPATIENT
Start: 2024-02-22 | End: 2024-02-22 | Stop reason: HOSPADM

## 2024-02-22 RX ORDER — SIMETHICONE 80 MG
80 TABLET,CHEWABLE ORAL 4 TIMES DAILY PRN
Status: DISCONTINUED | OUTPATIENT
Start: 2024-02-22 | End: 2024-02-24 | Stop reason: HOSPADM

## 2024-02-22 RX ORDER — BENZOCAINE/MENTHOL 6 MG-10 MG
1 LOZENGE MUCOUS MEMBRANE DAILY PRN
Status: DISCONTINUED | OUTPATIENT
Start: 2024-02-22 | End: 2024-02-24 | Stop reason: HOSPADM

## 2024-02-22 RX ORDER — ACETAMINOPHEN 325 MG/1
650 TABLET ORAL EVERY 4 HOURS PRN
Status: DISCONTINUED | OUTPATIENT
Start: 2024-02-22 | End: 2024-02-24 | Stop reason: HOSPADM

## 2024-02-22 RX ORDER — SODIUM CHLORIDE, SODIUM LACTATE, POTASSIUM CHLORIDE, CALCIUM CHLORIDE 600; 310; 30; 20 MG/100ML; MG/100ML; MG/100ML; MG/100ML
125 INJECTION, SOLUTION INTRAVENOUS CONTINUOUS
Status: DISCONTINUED | OUTPATIENT
Start: 2024-02-22 | End: 2024-02-22

## 2024-02-22 RX ADMIN — OXYTOCIN 2 MILLI-UNITS/MIN: 10 INJECTION INTRAVENOUS at 10:53

## 2024-02-22 RX ADMIN — SODIUM CHLORIDE, SODIUM LACTATE, POTASSIUM CHLORIDE, AND CALCIUM CHLORIDE 125 ML/HR: .6; .31; .03; .02 INJECTION, SOLUTION INTRAVENOUS at 04:25

## 2024-02-22 RX ADMIN — SODIUM CHLORIDE, SODIUM LACTATE, POTASSIUM CHLORIDE, AND CALCIUM CHLORIDE 125 ML/HR: .6; .31; .03; .02 INJECTION, SOLUTION INTRAVENOUS at 12:02

## 2024-02-22 RX ADMIN — ROPIVACAINE HYDROCHLORIDE: 2 INJECTION, SOLUTION EPIDURAL; INFILTRATION at 11:34

## 2024-02-22 RX ADMIN — LIDOCAINE HYDROCHLORIDE AND EPINEPHRINE 3 ML: 15; 5 INJECTION, SOLUTION EPIDURAL at 04:01

## 2024-02-22 RX ADMIN — BENZOCAINE AND LEVOMENTHOL 1 APPLICATION: 200; 5 SPRAY TOPICAL at 18:15

## 2024-02-22 RX ADMIN — PENICILLIN G POTASSIUM 5 MILLION UNITS: 20000000 INJECTION, POWDER, FOR SOLUTION INTRAVENOUS at 01:11

## 2024-02-22 RX ADMIN — PENICILLIN G POTASSIUM 2.5 MILLION UNITS: 20000000 INJECTION, POWDER, FOR SOLUTION INTRAVENOUS at 05:03

## 2024-02-22 RX ADMIN — PENICILLIN G POTASSIUM 2.5 MILLION UNITS: 20000000 INJECTION, POWDER, FOR SOLUTION INTRAVENOUS at 08:59

## 2024-02-22 RX ADMIN — SODIUM CHLORIDE, SODIUM LACTATE, POTASSIUM CHLORIDE, AND CALCIUM CHLORIDE 125 ML/HR: .6; .31; .03; .02 INJECTION, SOLUTION INTRAVENOUS at 01:11

## 2024-02-22 RX ADMIN — ROPIVACAINE HYDROCHLORIDE 5 ML: 2 INJECTION EPIDURAL; INFILTRATION; PERINEURAL at 04:03

## 2024-02-22 RX ADMIN — ROPIVACAINE HYDROCHLORIDE: 2 INJECTION, SOLUTION EPIDURAL; INFILTRATION at 04:04

## 2024-02-22 RX ADMIN — LIDOCAINE HYDROCHLORIDE AND EPINEPHRINE 2 ML: 15; 5 INJECTION, SOLUTION EPIDURAL at 04:02

## 2024-02-22 RX ADMIN — SODIUM CHLORIDE, SODIUM LACTATE, POTASSIUM CHLORIDE, AND CALCIUM CHLORIDE 300 ML: .6; .31; .03; .02 INJECTION, SOLUTION INTRAVENOUS at 13:04

## 2024-02-22 NOTE — OB LABOR/OXYTOCIN SAFETY PROGRESS
Oxytocin Safety Progress Check Note - Winnie Alba Rufina Lopez 29 y.o. female MRN: [id number]    Unit/Bed#: -01 Encounter: [id number]    Dose (hiwot-units/min) Oxytocin: 0 hiwot-units/min  Contraction Frequency (minutes): 4-6  Contraction Intensity: Moderate  Uterine Activity Characteristics: Irregular  Cervical Dilation: 9        Cervical Effacement: 90  Fetal Station: 1  Baseline Rate (FHR): 130 bpm  Fetal Heart Rate (FHT): 128 BPM  FHR Category: II               Vital Signs:   Vitals:    02/22/24 1311   BP: 98/63   Pulse: 83   Resp:    Temp:    SpO2:        Notes/comments:   Recurrent variables. Maternal repositioning. Moderate variability present. Continue repositioning in the setting of continued cervical change. Continue with pitocin off.       Raquel Renner MD 2/22/2024 2:04 PM

## 2024-02-22 NOTE — DISCHARGE SUMMARY
Discharge Summary - OB/GYN  Winnie Lopez 29 y.o. female MRN: [id number]  Unit/Bed#: -01 Encounter: [id number]    Admission Date: 2024     Discharge Date: 2024    Admitting Attending: Reinier Gerber Attending: Osman    Discharging Attending: Heron    Principal Diagnosis: Pregnancy at 40w4d    Secondary Diagnosis:   1. GBS bacteruria   2. Normal labor    Procedures: spontaneous vaginal delivery    Anesthesia: epidural    Hospital course: Ms. Winnie Lopez is a 29 y.o.  at 40w4d. She presented to labor and delivery for contractions and was admitted for labor. Her pregnancy was complicated by GBS bacteruria. On exam in triage she was noted to be 3.5/90/-1. She was expectantly managed and made change on her own to 6/90/-1. Amniotomy was performed for clear fluid that later was stained with thin meconium and NICU was notified. Pitocin was started for protracted dilation, but fetus started having recurrent variable decelerations a Pitocin was turned off.  An IUPC was placed and amnioinfusion was started.  An FSE was later placed to better track baby.  She progressed to complete and started pushing.    She delivered a viable male  on 2024 at 1515. Weight (7 lb 1.9 oz  via normal spontaneous vaginal delivery. She sustained no lacerations during delivery which was adequately repaired. Apgars were 9 (1 min) and 9 (5 min).  was transferred to  nursery. Patient tolerated the procedure well.     Her post-delivery course was uncomplicated. Her postpartum pain was well controlled with oral analgesics.    On day of discharge, she was ambulating and able to reasonably perform all ADLs. She was voiding and had appropriate bowel function. Pain was well controlled. She was discharged home on postpartum day #1 without complications. Patient was instructed to follow up with her OB as an outpatient and was given appropriate warnings to call provider if  she develops signs of infection or uncontrolled pain.    Complications: none apparent    Condition at discharge: stable     Discharge instructions/Information to patient and family:   See after visit summary for information provided to patient and family.      Provisions for Follow-Up Care:  See after visit summary for information related to follow-up care and any pertinent home health orders.      Disposition: See After Visit Summary for discharge disposition information.    Planned Readmission: No    Discharge medications and instructions:   Please see AVS for full list of medications upon discharge.

## 2024-02-22 NOTE — H&P
H & P- Obstetrics   Winnie Lopez 29 y.o. female MRN: [id number]  Unit/Bed#: -01 Encounter: [id number]      Assessment/Plan:    Winnie is a 29 y.o.  at 40w4d admitted for labor    SVE: Cervical Dilation: 3-4  Cervical Effacement: 90  Fetal Station: -1  OB Examiner: KIANA    Normal labor  Assessment & Plan  Admit to OBGYN   Clear liquid diet   F/u T&S, CBC, RPR   IVF LR 125cc/hr   Continuous fetal monitoring and tocometry   Analgesia at maternal request   Vertex by TAUS  Expectant management      GBS bacteriuria  Assessment & Plan  Treat with PCN per protocol       Patient of: [facility]  This patient will be an INPATIENT  and I certify the anticipated length of stay is >2 Midnights  Discussed with Dr. Hills      SUBJECTIVE:    Chief Complaint: Painful contractions    HPI: Winnie Lopez is a 29 y.o.  with an JED of 2024, by Ultrasound at 40w4d who is being admitted for labor . She complains of uterine contractions, occurring every 5 minutes, has no LOF, and reports no VB. She states she has felt good FM. This pregnancy is uncomplicated. All other review of systems is negative.       Pregnancy Plan:  Pregnancy: Cian  Support person: Savanna Griffith     Delivery Plans  Planned delivery method: Vaginal  Planned delivery location: [facility] L[facility]  Acceptable blood products: All     Post-Delivery Plans  Feeding intentions: Breast Milk      Patient Active Problem List   Diagnosis    40 weeks gestation of pregnancy    GBS bacteriuria    Premature rupture of membranes    Normal labor       OB History    Para Term  AB Living   2       1     SAB IAB Ectopic Multiple Live Births                  # Outcome Date GA Lbr Akira/2nd Weight Sex Delivery Anes PTL Lv   2 Current            1 AB                No past medical history on file.    Past Surgical History:   Procedure Laterality Date    LASIK Bilateral        Social History     Tobacco Use     Smoking status: Never     Passive exposure: Never    Smokeless tobacco: Never   Substance Use Topics    Alcohol use: Not Currently       No Known Allergies    Medications Prior to Admission   Medication    Prenatal Vit-Fe Fumarate-FA (PRENATAL VITAMIN PO)           OBJECTIVE:  Vitals:  Temp:  [98.1 °F (36.7 °C)] 98.1 °F (36.7 °C)  HR:  [91] 91  Resp:  [16] 16  BP: (121)/(77) 121/77  Body mass index is 26.61 kg/m².     Physical Exam:  General: Uncomfortable, not distress  Respiratory: Labored breathing through contractions  Cardiovascular: Regular rate.  Abdomen: Soft, gravid, nontender  Fundal Height: Appropriate for gestational age.  Extremities: Warm and well perfused.  Non tender.  Psychiatric: Behavioral normal        FHT:  Baseline Rate (FHR): 140 bpm  Variability: Moderate  Accelerations: 15 x 15 or greater, At variable times  Decelerations: (!) Variable, Late, Trace 70-80 bpm, For < 60 seconds    TOCO:   Contraction Frequency (minutes): 3-4  Contraction Duration (seconds):   Contraction Intensity: Mild      Prenatal Labs:   I have personally reviewed pertinent reports.  Blood Type:   Lab Results   Component Value Date/Time    ABO Grouping O 08/16/2023 04:03 PM   D (Rh type):   Lab Results   Component Value Date/Time    Rh Factor Positive 08/16/2023 04:03 PM   Antibody Screen: Negative  HCT/HGB:   Lab Results   Component Value Date/Time    Hematocrit 36.3 02/22/2024 01:02 AM    Hemoglobin 12.2 02/22/2024 01:02 AM    MCV:   Lab Results   Component Value Date/Time    MCV 91 02/22/2024 01:02 AM   Platelets:   Lab Results   Component Value Date/Time    Platelets 297 02/22/2024 01:02 AM   1 hour Glucola:   Lab Results   Component Value Date/Time    Glucose 96 11/22/2023 12:46 PM   Varicella: unknown  Rubella:   Lab Results   Component Value Date/Time    Rubella IgG Quant 93.2 08/16/2023 04:03 PM   VDRL/RPR: Non reactive  Urine Culture/Screen:   Lab Results   Component Value Date/Time    Urine Culture (A)  "08/16/2023 04:03 PM     10,000-19,000 cfu/ml Beta Hemolytic Streptococcus Group B   Hep B:   Lab Results   Component Value Date/Time    Hepatitis B Surface Ag Non-reactive 08/16/2023 04:03 PM   Hep C: Non reactive  HIV: Non reactive  Chlamydia: Negative  Gonorrhea:   Lab Results   Component Value Date/Time    N gonorrhoeae, DNA Probe Negative 08/16/2023 03:02 PM   Group B Strep: Positive      Ebony Warren MD  2/22/2024  1:16 AM        Portions of the record may have been created with voice recognition software.  Occasional wrong word or \"sound a like\" substitutions may have occurred due to the inherent limitations of voice recognition software.  Read the chart carefully and recognize, using context, where substitutions have occurred    "

## 2024-02-22 NOTE — OB LABOR/OXYTOCIN SAFETY PROGRESS
Oxytocin Safety Progress Check Note - Winnie Alba Rufina Lopez 29 y.o. female MRN: [id number]    Unit/Bed#: -01 Encounter: [id number]    Dose (hiwot-units/min) Oxytocin: 0 hiwot-units/min  Contraction Frequency (minutes): 3  Contraction Intensity: Mild  Uterine Activity Characteristics: Irregular  Cervical Dilation: 8        Cervical Effacement: 90  Fetal Station: 0  Baseline Rate (FHR): 135 bpm  Fetal Heart Rate (FHT): 134 BPM  FHR Category: II               Vital Signs:   Vitals:    02/22/24 1239   BP: 108/62   Pulse: 90   Resp:    Temp:    SpO2:        Notes/comments:   SVE as above. Variable decelerations with contractions. IUPC placed. Amnioinfusion started for recurrent variables. Pitocin was at 2, but was turned off. Can consider restarting if needed and FHT returns to cat I. Dr. Brewer aware.     Raquel Renner MD 2/22/2024 12:54 PM

## 2024-02-22 NOTE — OB LABOR/OXYTOCIN SAFETY PROGRESS
Labor Progress Note - Winnie Alba Rufina Lopez 29 y.o. female MRN: [id number]    Unit/Bed#:  204-01 Encounter: [id number]       Contraction Frequency (minutes): 3-4  Contraction Intensity: Mild  Uterine Activity Characteristics: Irregular  Cervical Dilation: 4        Cervical Effacement: 90  Fetal Station: -1  Baseline Rate (FHR): 140 bpm  Fetal Heart Rate (FHT): 133 BPM  FHR Category: I               Vital Signs:   Vitals:    02/22/24 0033   BP: 121/77   Pulse: 91   Resp: 16   Temp: 98.1 °F (36.7 °C)   SpO2: 100%       Notes/comments:   Winnie is doing an excellent job breathing through contractions and laboring on the yoga ball. SVE with mild change from prior. She is getting the [place] monitor on and plans to continue to be mobile through early labor. FHR category I. Continue expectant management, consider augmentation with AROM if patient unchanged after several more hours.    Ebony Warren MD 2/22/2024 2:47 AM

## 2024-02-22 NOTE — OB LABOR/OXYTOCIN SAFETY PROGRESS
Labor Progress Note - Winnie Alba Rufina Lopez 29 y.o. female MRN: [id number]    Unit/Bed#:  204-01 Encounter: [id number]       Contraction Frequency (minutes): 4  Contraction Intensity: Mild  Uterine Activity Characteristics: Irregular  Cervical Dilation: 6        Cervical Effacement: 90  Fetal Station: -1  Baseline Rate (FHR): 145 bpm  Fetal Heart Rate (FHT): 141 BPM  FHR Category: 2               Vital Signs:   Vitals:    02/22/24 0754   BP: 95/51   Pulse: 93   Resp:    Temp:    SpO2:        Patient comfortable.  SVE as above, cervix very stretchy and soft. FHT cat 2 for occasional variables, spontaneously resolved. Renetta irregularly approx q4. Continue with expectant management. Will d/w Dr. Brewer.       Katarina Spann MD 2/22/2024 8:11 AM

## 2024-02-22 NOTE — LACTATION NOTE
This note was copied from a baby's chart.  CONSULT - LACTATION  Baby Boy (Channing Lopez 0 days male MRN: [id number]    [facility] Room / Bed: (N)/(N) Encounter: [id number]    Maternal Information     MOTHER:  Winnie Guaman  Maternal Age: 29 y.o.   OB History: # 1 - Date: None, Sex: None, Weight: None, GA: None, Delivery: None, Apgar1: None, Apgar5: None, Living: None, Birth Comments: None    # 2 - Date: 24, Sex: Male, Weight: None, GA: 40w4d, Delivery: Vaginal, Spontaneous, Apgar1: 9, Apgar5: 9, Living: Living, Birth Comments: None   Previouse breast reduction surgery? No    Lactation history:   Has patient previously breast fed: No   How long had patient previously breast fed:     Previous breast feeding complications:       Past Surgical History:   Procedure Laterality Date    LASIK Bilateral         Birth information:  YOB: 2025   Time of birth: 3:15 PM   Sex: male   Delivery type: Vaginal, Spontaneous   Birth Weight: No birth weight on file.   Percent of Weight Change: Birth weight not on file     Gestational Age: 40w4d   [unfilled]    Assessment     Breast and nipple assessment: normal assessment     Assessment: normal assessment    Feeding assessment: feeding well  LATCH:  Latch: Grasps breast, tongue down, lips flanged, rhythmic sucking   Audible Swallowing: A few with stimulation   Type of Nipple: Everted (After stimulation)   Comfort (Breast/Nipple): Soft/non-tender   Hold (Positioning): Partial assist, teach one side, mother does other, staff holds   LATCH Score: 8        Having latch problems? No   Position(s) Used Cross Cradle   Breasts/Nipples   Left Breast Soft   Right Breast Soft   Left Nipple Everted   Right Nipple Everted   Breastfeeding Progress Not yet established;Breastfeeding well   Breast Pump   Pump 3  (Spectra S1)   Patient Follow-Up   Lactation Consult Status 2   Follow-Up Type  Inpatient;Call as needed   Other OB Lactation Documentation    Additional Problem Noted Edc. on proper alignment including nipple to nose and planting chin into breast. Requires support on jaws, tight bottom jaw. Deep latch obtained. RSB & DC booklets reviewed.       Feeding recommendations:  breast feed on demand  Called for latch assist by RN.   Mother reports she would like to breastfeed baby for as long as possible.   Mother attempting to latch baby in football hold on left breast, nipple aligned with mouth. LC attempted to assist with latch attempt, noted that baby would have wide gape but would drag chin along breast and not obtain latch.     Mother and baby assisted into cross-cradle hold on right breast. Baby's nose aligned with nipple and chin planted on breast. Firm support provided to baby's jaw,and baby obtained deep latch. Mother reports tugging sensation.     Reviewed RSB & DC booklets with mother and father. Educated provided on feeding on demand, feeding cues, feeding every 2-3 hours and offering each breast with each feeding and beginning on side last ended on.     Mother has spectra S1 breast pump through insurance.   Encouraged to call for additional questions or assistance.     Information on hand expression given. Discussed benefits of knowing how to manually express breast including stimulating milk supply, softening nipple for latch and evacuating breast in the event of engorgement.    Mom is encouraged to place baby skin to skin for feedings. Skin to skin education provided for baby placement on mother's chest, baby only in diaper, blankets below shoulders on baby's back. Skin to skin is encouraged to continue at home for feedings and between feedings.    - Start feedings on breast that last feeding ended   - allow no more than 3 hours between breast feeding sessions   - time between feedings is counted from the beginning of the first feed to the beginning of the next feeding  session    Reviewed early signs of hunger, including tensing of hands and shoulders - no need to wait for open eyes.  Crying is a late hunger sign.  If baby is crying, soothe baby first and then attempt to latch.  Reviewed normal sucking patterns: transition from stimulation to nutritive to release or non-nutritive. The goal is to see and hear lots of swallowing.    Reviewed normal nursing pattern: infant could latch on one breast up to 30 minutes or until releases on own. Signs of satiation is open hand with fingers that do not grab your finger.  Discussed difference in sensation of non-nutritive v nutritive sucking    Met with mother. Provided mother with Ready, Set, Baby booklet.    Talked about exclusive breastfeeding for the first 6 months.    Positioning and latch reviewed as well as showing images of other feeding positions.  Discussed the properties of a good latch in any position. Reviewed hand/manual expression.  Discussed s/s that baby is getting enough milk and some s/s that breastfeeding dyad may need further help.    Gave information on common concerns, what to expect the first few weeks after delivery, preparing for other caregivers, and how partners can help. Resources for support also provided.    Encouraged parents to call for assistance, questions, and concerns about breastfeeding.  Extension provided.        Marva Carroll RN 2/22/2024 5:12 PM

## 2024-02-22 NOTE — ANESTHESIA POSTPROCEDURE EVALUATION
Post-Op Assessment Note    CV Status:  Stable    Pain management: adequate      Post-op block assessment: catheter intact and no complications   Mental Status:  Alert and awake   Hydration Status:  Stable   PONV Controlled:  None   Airway Patency:  Patent     Post Op Vitals Reviewed: Yes    No anethesia notable event occurred.    Staff: CRNA         Epidural removed without incident, pt has been OOB ambulating to bathroom.

## 2024-02-22 NOTE — PLAN OF CARE
Problem: BIRTH - VAGINAL/ SECTION  Goal: Fetal and maternal status remain reassuring during the birth process  Description: INTERVENTIONS:  - Monitor vital signs  - Monitor fetal heart rate  - Monitor uterine activity  - Monitor labor progression (vaginal delivery)  - DVT prophylaxis  - Antibiotic prophylaxis  Outcome: Progressing  Goal: Emotionally satisfying birthing experience for mother/fetus  Description: Interventions:  - Assess, plan, implement and evaluate the nursing care given to the patient in labor  - Advocate the philosophy that each childbirth experience is a unique experience and support the family's chosen level of involvement and control during the labor process   - Actively participate in both the patient's and family's teaching of the birth process  - Consider cultural, Gnosticism and age-specific factors and plan care for the patient in labor  Outcome: Progressing     Problem: POSTPARTUM  Goal: Experiences normal postpartum course  Description: INTERVENTIONS:  - Monitor maternal vital signs  - Assess uterine involution and lochia  Outcome: Progressing  Goal: Appropriate maternal -  bonding  Description: INTERVENTIONS:  - Identify family support  - Assess for appropriate maternal/infant bonding   -Encourage maternal/infant bonding opportunities  - Referral to  or  as needed  Outcome: Progressing  Goal: Establishment of infant feeding pattern  Description: INTERVENTIONS:  - Assess breast/bottle feeding  - Refer to lactation as needed  Outcome: Progressing  Goal: Incision(s), wounds(s) or drain site(s) healing without S/S of infection  Description: INTERVENTIONS  - Assess and document dressing, incision, wound bed, drain sites and surrounding tissue  - Provide patient and family education  - Perform skin care/dressing changes every   Outcome: Progressing     Problem: Knowledge Deficit  Goal: Verbalizes understanding of labor plan  Description: Assess  patient/family/caregiver's baseline knowledge level and ability to understand information.  Provide education via patient/family/caregiver's preferred learning method at appropriate level of understanding.     1. Provide teaching at level of understanding.  2. Provide teaching via preferred learning method(s).  Outcome: Progressing  Goal: Patient/family/caregiver demonstrates understanding of disease process, treatment plan, medications, and discharge instructions  Description: Complete learning assessment and assess knowledge base.  Interventions:  - Provide teaching at level of understanding  - Provide teaching via preferred learning methods  Outcome: Progressing     Problem: Labor & Delivery  Goal: Manages discomfort  Description: Assess and monitor for signs and symptoms of discomfort.  Assess patient's pain level regularly and per hospital policy.  Administer medications as ordered. Support use of nonpharmacological methods to help control pain such as distraction, imagery, relaxation, and application of heat and cold.  Collaborate with interdisciplinary team and patient to determine appropriate pain management plan.    1. Include patient in decisions related to comfort.  2. Offer non-pharmacological pain management interventions.  3. Report ineffective pain management to physician.  Outcome: Progressing  Goal: Patient vital signs are stable  Description: 1. Assess vital signs - vaginal delivery.  Outcome: Progressing     Problem: PAIN - ADULT  Goal: Verbalizes/displays adequate comfort level or baseline comfort level  Description: Interventions:  - Encourage patient to monitor pain and request assistance  - Assess pain using appropriate pain scale  - Administer analgesics based on type and severity of pain and evaluate response  - Implement non-pharmacological measures as appropriate and evaluate response  - Consider cultural and social influences on pain and pain management  - Notify physician/advanced  practitioner if interventions unsuccessful or patient reports new pain  Outcome: Progressing     Problem: INFECTION - ADULT  Goal: Absence or prevention of progression during hospitalization  Description: INTERVENTIONS:  - Assess and monitor for signs and symptoms of infection  - Monitor lab/diagnostic results  - Monitor all insertion sites, i.e. indwelling lines, tubes, and drains  - Monitor endotracheal if appropriate and nasal secretions for changes in amount and color  - [place] appropriate cooling/warming therapies per order  - Administer medications as ordered  - Instruct and encourage patient and family to use good hand hygiene technique  - Identify and instruct in appropriate isolation precautions for identified infection/condition  Outcome: Progressing  Goal: Absence of fever/infection during neutropenic period  Description: INTERVENTIONS:  - Monitor WBC    Outcome: Progressing     Problem: SAFETY ADULT  Goal: Patient will remain free of falls  Description: INTERVENTIONS:  - Educate patient/family on patient safety including physical limitations  - Instruct patient to call for assistance with activity   - Consult OT/PT to assist with strengthening/mobility   - Keep Call bell within reach  - Keep bed low and locked with side rails adjusted as appropriate  - Keep care items and personal belongings within reach  - Initiate and maintain comfort rounds  - Make Fall Risk Sign visible to staff  - Offer Toileting every  Hours, in advance of need  - Initiate/Maintain alarm  - Obtain necessary fall risk management equipment:   - Apply yellow socks and bracelet for high fall risk patients  - Consider moving patient to room near nurses station  Outcome: Progressing  Goal: Maintain or return to baseline ADL function  Description: INTERVENTIONS:  -  Assess patient's ability to carry out ADLs; assess patient's baseline for ADL function and identify physical deficits which impact ability to perform ADLs (bathing, care of  mouth/teeth, toileting, grooming, dressing, etc.)  - Assess/evaluate cause of self-care deficits   - Assess range of motion  - Assess patient's mobility; develop plan if impaired  - Assess patient's need for assistive devices and provide as appropriate  - Encourage maximum independence but intervene and supervise when necessary  - Involve family in performance of ADLs  - Assess for home care needs following discharge   - Consider OT consult to assist with ADL evaluation and planning for discharge  - Provide patient education as appropriate  Outcome: Progressing  Goal: Maintains/Returns to pre admission functional level  Description: INTERVENTIONS:  - Perform AM-PAC 6 Click Basic Mobility/ Daily Activity assessment daily.  - Set and communicate daily mobility goal to care team and patient/family/caregiver.   - Collaborate with rehabilitation services on mobility goals if consulted  - Perform Range of Motion  times a day.  - Reposition patient every  hours.  - Dangle patient  times a day  - Stand patient  times a day  - Ambulate patient  times a day  - Out of bed to chair  times a day   - Out of bed for meals  times a day  - Out of bed for toileting  - Record patient progress and toleration of activity level   Outcome: Progressing     Problem: DISCHARGE PLANNING  Goal: Discharge to home or other facility with appropriate resources  Description: INTERVENTIONS:  - Identify barriers to discharge w/patient and caregiver  - Arrange for needed discharge resources and transportation as appropriate  - Identify discharge learning needs (meds, wound care, etc.)  - Arrange for interpretive services to assist at discharge as needed  - Refer to Case Management Department for coordinating discharge planning if the patient needs post-hospital services based on physician/advanced practitioner order or complex needs related to functional status, cognitive ability, or social support system  Outcome: Progressing

## 2024-02-22 NOTE — PLAN OF CARE
Problem: BIRTH - VAGINAL/ SECTION  Goal: Fetal and maternal status remain reassuring during the birth process  Description: INTERVENTIONS:  - Monitor vital signs  - Monitor fetal heart rate  - Monitor uterine activity  - Monitor labor progression (vaginal delivery)  - DVT prophylaxis  - Antibiotic prophylaxis  2024 by Ashley Tang RN  Outcome: Completed  2024 by Ashley Tang RN  Outcome: Progressing  Goal: Emotionally satisfying birthing experience for mother/fetus  Description: Interventions:  - Assess, plan, implement and evaluate the nursing care given to the patient in labor  - Advocate the philosophy that each childbirth experience is a unique experience and support the family's chosen level of involvement and control during the labor process   - Actively participate in both the patient's and family's teaching of the birth process  - Consider cultural, Faith and age-specific factors and plan care for the patient in labor  2024 by Ashley Tang RN  Outcome: Completed  2024 by Ashley Tang RN  Outcome: Progressing     Problem: POSTPARTUM  Goal: Experiences normal postpartum course  Description: INTERVENTIONS:  - Monitor maternal vital signs  - Assess uterine involution and lochia  2024 by Ashley Tagn RN  Outcome: Progressing  2024 by Ashley Tang RN  Outcome: Progressing  Goal: Appropriate maternal -  bonding  Description: INTERVENTIONS:  - Identify family support  - Assess for appropriate maternal/infant bonding   -Encourage maternal/infant bonding opportunities  - Referral to  or  as needed  2024 by Ashley Tang RN  Outcome: Progressing  2024 by Ashley Tang RN  Outcome: Progressing  Goal: Establishment of infant feeding pattern  Description: INTERVENTIONS:  - Assess breast/bottle feeding  - Refer to lactation as needed  2024 by Ashley Tang  RN  Outcome: Progressing  2/22/2024 0707 by Ashley Tang RN  Outcome: Progressing  Goal: Incision(s), wounds(s) or drain site(s) healing without S/S of infection  Description: INTERVENTIONS  - Assess and document dressing, incision, wound bed, drain sites and surrounding tissue  - Provide patient and family education  - Perform skin care/dressing changes every   2/22/2024 1849 by Ashley Tang RN  Outcome: Progressing  2/22/2024 0707 by Ashley Tang RN  Outcome: Progressing     Problem: Knowledge Deficit  Goal: Verbalizes understanding of labor plan  Description: Assess patient/family/caregiver's baseline knowledge level and ability to understand information.  Provide education via patient/family/caregiver's preferred learning method at appropriate level of understanding.     1. Provide teaching at level of understanding.  2. Provide teaching via preferred learning method(s).  2/22/2024 1849 by Ashley Tang RN  Outcome: Completed  2/22/2024 0707 by Ashley Tang RN  Outcome: Progressing  Goal: Patient/family/caregiver demonstrates understanding of disease process, treatment plan, medications, and discharge instructions  Description: Complete learning assessment and assess knowledge base.  Interventions:  - Provide teaching at level of understanding  - Provide teaching via preferred learning methods  2/22/2024 1849 by Ashley Tang RN  Outcome: Completed  2/22/2024 0707 by Ashley Tang RN  Outcome: Progressing     Problem: Labor & Delivery  Goal: Manages discomfort  Description: Assess and monitor for signs and symptoms of discomfort.  Assess patient's pain level regularly and per hospital policy.  Administer medications as ordered. Support use of nonpharmacological methods to help control pain such as distraction, imagery, relaxation, and application of heat and cold.  Collaborate with interdisciplinary team and patient to determine appropriate pain management plan.    1. Include patient in  decisions related to comfort.  2. Offer non-pharmacological pain management interventions.  3. Report ineffective pain management to physician.  2/22/2024 1849 by Ashley Tang RN  Outcome: Completed  2/22/2024 0707 by Ashley Tang RN  Outcome: Progressing  Goal: Patient vital signs are stable  Description: 1. Assess vital signs - vaginal delivery.  2/22/2024 1849 by Ashley Tang RN  Outcome: Completed  2/22/2024 0707 by Ashley Tang RN  Outcome: Progressing     Problem: PAIN - ADULT  Goal: Verbalizes/displays adequate comfort level or baseline comfort level  Description: Interventions:  - Encourage patient to monitor pain and request assistance  - Assess pain using appropriate pain scale  - Administer analgesics based on type and severity of pain and evaluate response  - Implement non-pharmacological measures as appropriate and evaluate response  - Consider cultural and social influences on pain and pain management  - Notify physician/advanced practitioner if interventions unsuccessful or patient reports new pain  2/22/2024 1849 by Ashley Tang RN  Outcome: Progressing  2/22/2024 0707 by Ashley Tang RN  Outcome: Progressing     Problem: INFECTION - ADULT  Goal: Absence or prevention of progression during hospitalization  Description: INTERVENTIONS:  - Assess and monitor for signs and symptoms of infection  - Monitor lab/diagnostic results  - Monitor all insertion sites, i.e. indwelling lines, tubes, and drains  - Monitor endotracheal if appropriate and nasal secretions for changes in amount and color  - [place] appropriate cooling/warming therapies per order  - Administer medications as ordered  - Instruct and encourage patient and family to use good hand hygiene technique  - Identify and instruct in appropriate isolation precautions for identified infection/condition  2/22/2024 1849 by Ashley Tang RN  Outcome: Progressing  2/22/2024 0707 by Ashley Tang RN  Outcome:  Progressing  Goal: Absence of fever/infection during neutropenic period  Description: INTERVENTIONS:  - Monitor WBC    2/22/2024 1849 by Ashley Tang RN  Outcome: Progressing  2/22/2024 0707 by Ashley Tang RN  Outcome: Progressing     Problem: SAFETY ADULT  Goal: Patient will remain free of falls  Description: INTERVENTIONS:  - Educate patient/family on patient safety including physical limitations  - Instruct patient to call for assistance with activity   - Consult OT/PT to assist with strengthening/mobility   - Keep Call bell within reach  - Keep bed low and locked with side rails adjusted as appropriate  - Keep care items and personal belongings within reach  - Initiate and maintain comfort rounds  - Make Fall Risk Sign visible to staff  - Offer Toileting every  Hours, in advance of need  - Initiate/Maintain alarm  - Obtain necessary fall risk management equipment:   - Apply yellow socks and bracelet for high fall risk patients  - Consider moving patient to room near nurses station  2/22/2024 1849 by Ashley Tang RN  Outcome: Progressing  2/22/2024 0707 by Ashley Tang RN  Outcome: Progressing  Goal: Maintain or return to baseline ADL function  Description: INTERVENTIONS:  -  Assess patient's ability to carry out ADLs; assess patient's baseline for ADL function and identify physical deficits which impact ability to perform ADLs (bathing, care of mouth/teeth, toileting, grooming, dressing, etc.)  - Assess/evaluate cause of self-care deficits   - Assess range of motion  - Assess patient's mobility; develop plan if impaired  - Assess patient's need for assistive devices and provide as appropriate  - Encourage maximum independence but intervene and supervise when necessary  - Involve family in performance of ADLs  - Assess for home care needs following discharge   - Consider OT consult to assist with ADL evaluation and planning for discharge  - Provide patient education as appropriate  2/22/2024 1849  by Ashley Tang RN  Outcome: Progressing  2/22/2024 0707 by Ashley Tang RN  Outcome: Progressing  Goal: Maintains/Returns to pre admission functional level  Description: INTERVENTIONS:  - Perform AM-PAC 6 Click Basic Mobility/ Daily Activity assessment daily.  - Set and communicate daily mobility goal to care team and patient/family/caregiver.   - Collaborate with rehabilitation services on mobility goals if consulted  - Perform Range of Motion times a day.  - Reposition patient every  hours.  - Dangle patient  times a day  - Stand patient  times a day  - Ambulate patient  times a day  - Out of bed to chair  times a day   - Out of bed for meal times a day  - Out of bed for toileting  - Record patient progress and toleration of activity level   2/22/2024 1849 by Ashley Tang RN  Outcome: Progressing  2/22/2024 0707 by Ashley Tang RN  Outcome: Progressing     Problem: DISCHARGE PLANNING  Goal: Discharge to home or other facility with appropriate resources  Description: INTERVENTIONS:  - Identify barriers to discharge w/patient and caregiver  - Arrange for needed discharge resources and transportation as appropriate  - Identify discharge learning needs (meds, wound care, etc.)  - Arrange for interpretive services to assist at discharge as needed  - Refer to Case Management Department for coordinating discharge planning if the patient needs post-hospital services based on physician/advanced practitioner order or complex needs related to functional status, cognitive ability, or social support system  2/22/2024 1849 by Ashley Tang RN  Outcome: Progressing  2/22/2024 0707 by Ashley Tang RN  Outcome: Progressing     Problem: ALTERATION IN THE BREAST  Goal: Optimize infant feeding at the breast  Description: INTERVENTIONS:  - Latch, breast and nipple assessment  - Assess prior breast feeding history  - Hand expression of breast milk  - For cracked, bleeding and or sore nipples reassess latch,  treat damaged nipple  -Educate mother on feeding cues  -Positioning/latch techniques  Outcome: Progressing     Problem: INADEQUATE LATCH, SUCK OR SWALLOW  Goal: Demonstrate ability to latch and sustain latch, audible swallowing and satiety  Description: INTERVENTIONS:  - Assess oral anatomy, notify Physician/AP for abnormal findings  - Establish milk expression  - Maximize feeding opportunity (skin to skin, behavioral state)  - Position/latch techniques  - Discourage use of pacifier-artificial nipple  - Mechanical pumping  - Nipple Shield  - Supplemental formula feeding (Physician/AP order)  - Alternative feeding method  Outcome: Progressing

## 2024-02-22 NOTE — ANESTHESIA PREPROCEDURE EVALUATION
Procedure:  LABOR ANALGESIA    Relevant Problems   GYN   (+) 40 weeks gestation of pregnancy        Physical Exam    Airway    Mallampati score: III         Dental       Cardiovascular      Pulmonary      Other Findings  post-pubertal.      Anesthesia Plan  ASA Score- 2     Anesthesia Type-     Plan Factors-    Chart reviewed.   Existing labs reviewed. Patient summary reviewed.    Patient is not a current smoker.              Induction-     Postoperative Plan-     Informed Consent- Anesthetic plan and risks discussed with patient.  I personally reviewed this patient with the CRNA. Discussed and agreed on the Anesthesia Plan with the CRNA..

## 2024-02-22 NOTE — OB LABOR/OXYTOCIN SAFETY PROGRESS
Labor Progress Note - Winnie Alba Rufina Lopez 29 y.o. female MRN: [id number]    Unit/Bed#:  204-01 Encounter: [id number]       Contraction Frequency (minutes): difficult trace  Contraction Intensity: Mild/Moderate  Uterine Activity Characteristics: Irregular  Cervical Dilation: 6        Cervical Effacement: 90  Fetal Station: -1  Baseline Rate (FHR): 150 bpm  Fetal Heart Rate (FHT): 151 BPM  FHR Category: I               Vital Signs:   Vitals:    02/22/24 0423   BP: 105/58   Pulse: 86   Resp:    Temp:    SpO2:        Notes/comments:   Winnie is resting comfortably with her epidural, SVE as above. Continue expectant management and consider amniotomy if she is unchanged at next check. FHR category I.    Ebony Warren MD 2/22/2024 4:28 AM

## 2024-02-22 NOTE — OB LABOR/OXYTOCIN SAFETY PROGRESS
Oxytocin Safety Progress Check Note - Winnie Alba Rufina Lopez 29 y.o. female MRN: [id number]    Unit/Bed#: -01 Encounter: [id number]    Dose (hiwot-units/min) Oxytocin: 0 hiowt-units/min  Contraction Frequency (minutes): 4-5  Contraction Intensity: Mild  Uterine Activity Characteristics: Irregular  Cervical Dilation: 10  Dilation Complete Date: 02/22/24  Dilation Complete Time: 1439  Cervical Effacement: 100  Fetal Station: 1  Baseline Rate (FHR): 130 bpm  Fetal Heart Rate (FHT): 169 BPM  FHR Category: II               Vital Signs:   Vitals:    02/22/24 1425   BP: (!) 89/54   Pulse: 85   Resp:    Temp:    SpO2:        Notes/comments:   SVE as above.  Dr. Brewer aware.  Plan to start pushing.    Raquel Renner MD 2/22/2024 2:39 PM

## 2024-02-22 NOTE — OB LABOR/OXYTOCIN SAFETY PROGRESS
Oxytocin Safety Progress Check Note - Winnie Alba Rufina Lopez 29 y.o. female MRN: [id number]    Unit/Bed#: -01 Encounter: [id number]    Dose (hiwot-units/min) Oxytocin: 0 hiwot-units/min  Contraction Frequency (minutes): 4-5  Contraction Intensity: Mild  Uterine Activity Characteristics: Irregular  Cervical Dilation: 9        Cervical Effacement: 90  Fetal Station: 1  Baseline Rate (FHR): 130 bpm  Fetal Heart Rate (FHT): 169 BPM  FHR Category: II               Vital Signs:   Vitals:    02/22/24 1405   BP:    Pulse:    Resp:    Temp: 98.5 °F (36.9 °C)   SpO2:        Notes/comments:   Difficulty tracing baby as we continue to reposition mom.  FSE placed for more consistent fetal monitoring.  FSE placed without issue.  FHT with FSE currently looking better with moderate variability and spontaneous accelerations.  2 contractions so far without any decelerations.  Dr. Brewer aware.    Raquel Renner MD 2/22/2024 2:25 PM       Tube feed stopped at this time d/t pt requiring increased oxygen levels, moist cough, and requiring frequent NTS. Nasal tracheal suctioned pt at this time and large amount of tan, tube feed colored mucus removed. Dr. Zhen Hunter notified.

## 2024-02-22 NOTE — OB LABOR/OXYTOCIN SAFETY PROGRESS
Labor Progress Note - Winnie Alba Rufina Lopez 29 y.o. female MRN: [id number]    Unit/Bed#: -01 Encounter: [id number]       Contraction Frequency (minutes): 4  Contraction Intensity: Mild  Uterine Activity Characteristics: Irregular  Cervical Dilation: 6        Cervical Effacement: 90  Fetal Station: -1  Baseline Rate (FHR): 115 bpm  Fetal Heart Rate (FHT): 151 BPM  FHR Category: 1               Vital Signs:   Vitals:    02/22/24 1010   BP: 107/64   Pulse: 93   Resp:    Temp:    SpO2:        Patient comfortable with epidural.  SVE as above. FHT cat 1. Ren[place] irregularly approx q4. Amniotomy performed for clear fluid. Will d/w Dr. Brewer.       Katarina Spann MD 2/22/2024 10:28 AM

## 2024-02-22 NOTE — PLAN OF CARE
Problem: BIRTH - VAGINAL/ SECTION  Goal: Fetal and maternal status remain reassuring during the birth process  Description: INTERVENTIONS:  - Monitor vital signs  - Monitor fetal heart rate  - Monitor uterine activity  - Monitor labor progression (vaginal delivery)  - DVT prophylaxis  - Antibiotic prophylaxis  Outcome: Progressing  Goal: Emotionally satisfying birthing experience for mother/fetus  Description: Interventions:  - Assess, plan, implement and evaluate the nursing care given to the patient in labor  - Advocate the philosophy that each childbirth experience is a unique experience and support the family's chosen level of involvement and control during the labor process   - Actively participate in both the patient's and family's teaching of the birth process  - Consider cultural, Shinto and age-specific factors and plan care for the patient in labor  Outcome: Progressing     Problem: POSTPARTUM  Goal: Experiences normal postpartum course  Description: INTERVENTIONS:  - Monitor maternal vital signs  - Assess uterine involution and lochia  Outcome: Progressing  Goal: Appropriate maternal -  bonding  Description: INTERVENTIONS:  - Identify family support  - Assess for appropriate maternal/infant bonding   -Encourage maternal/infant bonding opportunities  - Referral to  or  as needed  Outcome: Progressing  Goal: Establishment of infant feeding pattern  Description: INTERVENTIONS:  - Assess breast/bottle feeding  - Refer to lactation as needed  Outcome: Progressing  Goal: Incision(s), wounds(s) or drain site(s) healing without S/S of infection  Description: INTERVENTIONS  - Assess and document dressing, incision, wound bed, drain sites and surrounding tissue  - Provide patient and family education  - Perform skin care/dressing changes every  Outcome: Progressing     Problem: Knowledge Deficit  Goal: Verbalizes understanding of labor plan  Description: Assess  patient/family/caregiver's baseline knowledge level and ability to understand information.  Provide education via patient/family/caregiver's preferred learning method at appropriate level of understanding.     1. Provide teaching at level of understanding.  2. Provide teaching via preferred learning method(s).  Outcome: Progressing  Goal: Patient/family/caregiver demonstrates understanding of disease process, treatment plan, medications, and discharge instructions  Description: Complete learning assessment and assess knowledge base.  Interventions:  - Provide teaching at level of understanding  - Provide teaching via preferred learning methods  Outcome: Progressing     Problem: Labor & Delivery  Goal: Manages discomfort  Description: Assess and monitor for signs and symptoms of discomfort.  Assess patient's pain level regularly and per hospital policy.  Administer medications as ordered. Support use of nonpharmacological methods to help control pain such as distraction, imagery, relaxation, and application of heat and cold.  Collaborate with interdisciplinary team and patient to determine appropriate pain management plan.    1. Include patient in decisions related to comfort.  2. Offer non-pharmacological pain management interventions.  3. Report ineffective pain management to physician.  Outcome: Progressing  Goal: Patient vital signs are stable  Description: 1. Assess vital signs - vaginal delivery.  Outcome: Progressing     Problem: PAIN - ADULT  Goal: Verbalizes/displays adequate comfort level or baseline comfort level  Description: Interventions:  - Encourage patient to monitor pain and request assistance  - Assess pain using appropriate pain scale  - Administer analgesics based on type and severity of pain and evaluate response  - Implement non-pharmacological measures as appropriate and evaluate response  - Consider cultural and social influences on pain and pain management  - Notify physician/advanced  practitioner if interventions unsuccessful or patient reports new pain  Outcome: Progressing     Problem: INFECTION - ADULT  Goal: Absence or prevention of progression during hospitalization  Description: INTERVENTIONS:  - Assess and monitor for signs and symptoms of infection  - Monitor lab/diagnostic results  - Monitor all insertion sites, i.e. indwelling lines, tubes, and drains  - Monitor endotracheal if appropriate and nasal secretions for changes in amount and color  - [place] appropriate cooling/warming therapies per order  - Administer medications as ordered  - Instruct and encourage patient and family to use good hand hygiene technique  - Identify and instruct in appropriate isolation precautions for identified infection/condition  Outcome: Progressing  Goal: Absence of fever/infection during neutropenic period  Description: INTERVENTIONS:  - Monitor WBC    Outcome: Progressing     Problem: SAFETY ADULT  Goal: Patient will remain free of falls  Description: INTERVENTIONS:  - Educate patient/family on patient safety including physical limitations  - Instruct patient to call for assistance with activity   - Consult OT/PT to assist with strengthening/mobility   - Keep Call bell within reach  - Keep bed low and locked with side rails adjusted as appropriate  - Keep care items and personal belongings within reach  - Initiate and maintain comfort rounds  - Make Fall Risk Sign visible to staff  - Offer Toileting every Hours, in advance of need  - Initiate/Maintain alarm  - Obtain necessary fall risk management equipment:   - Apply yellow socks and bracelet for high fall risk patients  - Consider moving patient to room near nurses station  Outcome: Progressing  Goal: Maintain or return to baseline ADL function  Description: INTERVENTIONS:  -  Assess patient's ability to carry out ADLs; assess patient's baseline for ADL function and identify physical deficits which impact ability to perform ADLs (bathing, care of  mouth/teeth, toileting, grooming, dressing, etc.)  - Assess/evaluate cause of self-care deficits   - Assess range of motion  - Assess patient's mobility; develop plan if impaired  - Assess patient's need for assistive devices and provide as appropriate  - Encourage maximum independence but intervene and supervise when necessary  - Involve family in performance of ADLs  - Assess for home care needs following discharge   - Consider OT consult to assist with ADL evaluation and planning for discharge  - Provide patient education as appropriate  Outcome: Progressing  Goal: Maintains/Returns to pre admission functional level  Description: INTERVENTIONS:  - Perform AM-PAC 6 Click Basic Mobility/ Daily Activity assessment daily.  - Set and communicate daily mobility goal to care team and patient/family/caregiver.   - Collaborate with rehabilitation services on mobility goals if consulted  - Perform Range of Motion  times a day.  - Reposition patient every  hours.  - Dangle patient times a day  - Stand patient  times a day  - Ambulate patient  times a day  - Out of bed to chair times a day   - Out of bed for meals  times a day  - Out of bed for toileting  - Record patient progress and toleration of activity level   Outcome: Progressing     Problem: DISCHARGE PLANNING  Goal: Discharge to home or other facility with appropriate resources  Description: INTERVENTIONS:  - Identify barriers to discharge w/patient and caregiver  - Arrange for needed discharge resources and transportation as appropriate  - Identify discharge learning needs (meds, wound care, etc.)  - Arrange for interpretive services to assist at discharge as needed  - Refer to Case Management Department for coordinating discharge planning if the patient needs post-hospital services based on physician/advanced practitioner order or complex needs related to functional status, cognitive ability, or social support system  Outcome: Progressing

## 2024-02-22 NOTE — L&D DELIVERY NOTE
Vaginal Delivery Summary - OB/GYN   Winnie Lopez 29 y.o. female MRN: [id number]  Unit/Bed#: -01 Encounter: [id number]      Pre-delivery Diagnosis:   1. Pregnancy at 40 weeks   2. Normal labor  3. GBS bacteruria     Post-delivery Diagnosis: same, delivered    Procedure: Spontaneous Vaginal Delivery     Attending: Osman    Assistant(s): Darren Renner    Anesthesia: Epidural    QBL: 102 mL    Complications: none apparent    Specimens:   1. Arterial and venous cord gases  2. Cord blood  3. Segment of umbilical cord  4. Placenta to storage     Findings:  1. Viable male on 2024 at 1515, with APGARS of 9 and 9 at 1 and 5 minutes respectively  2. Spontaneous delivery of intact placenta at 1518  3. No lacerations   4. Blood gases:   Arterial pH: 7.218   Arterial base excess: -7.7   Venous pH: 7.383   Venous base excess: -4.9    Disposition:  Patient tolerated the procedure well and was recovering in labor and delivery room       Brief history and labor course:  Ms. Winnie Lopez is a 29 y.o.  at 40w4d. She presented to labor and delivery for contractions and was admitted for labor. Her pregnancy was complicated by GBS bacteruria. On exam in triage she was noted to be 3.5/90/-1. She was expectantly managed and made change on her own to 6/90/-1. Amniotomy was performed for clear fluid that later was stained with thin meconium and NICU was notified. Pitocin was started for protracted dilation, but fetus started having recurrent variable decelerations a Pitocin was turned off.  An IUPC was placed and amnioinfusion was started.  An FSE was later placed to better track baby.  She progressed to complete and started pushing.    Description of procedure:  After pushing for 30 minutes, at 1515 patient delivered a viable male , wt pending, apgars of 9 (1 min) and 9 (5 min). The fetal vertex delivered spontaneously. There was a tight nuchal cord that was delivered through. The left  anterior shoulder delivered atraumatically with maternal expulsive forces and the assistance of gentle downward traction. The right posterior shoulder delivered with maternal expulsive forces and the assistance of gentle upward traction. The remainder of the fetus delivered spontaneously. FSE was removed.     Upon delivery, the infant was placed on the mothers abdomen and the cord was clamped and cut after delayed cord clamping. The infant was noted to cry spontaneously and was moving all extremities appropriately. There was no evidence for injury. Awaiting nurse resuscitators evaluated the . Arterial and venous cord blood gases and cord blood was collected for analysis. These were promptly sent to the lab. In the immediate post-partum, 30 units of IV pitocin was administered, and the uterus was noted to contract down well with massage and pitocin. The placenta delivered spontaneously at 1518 and was noted to have a centrally inserted 3 vessel cord.     The vagina, cervix, perineum, and rectum were inspected and there was noted to be no lacerations.    Bimanual exam revealed minimal clots and good uterine tone.  The fundus was firm and at the level of the umbilicus.  At the conclusion of the procedure, all needle, sponge, and instrument counts were noted to be correct. Patient tolerated the procedure well and was allowed to recover in labor and delivery room with family and  before being transferred to the post-partum floor. Dr. Brewer was present and participated in all key portions of the case.      Raquel Renner MD  2024  3:41 PM

## 2024-02-22 NOTE — ASSESSMENT & PLAN NOTE
- Pain well controlled with oral analgesics  - Voiding spontaneously  - Moving bowels appropriately  - Tolerating PO fluids and solids  - Ambulating without difficulty  - Encourage breastfeeding and familial bonding  - Contraception: undecided

## 2024-02-22 NOTE — ANESTHESIA PROCEDURE NOTES
Epidural Block    Patient location during procedure: OB/L&D  Start time: 2/22/2024 4:00 AM  Reason for block: procedure for pain  Staffing  Performed by: Cecille Lemus CRNA  Authorized by: Syed Gerber MD    Preanesthetic Checklist  Completed: patient identified, IV checked, site marked, risks and benefits discussed, surgical consent, monitors and equipment checked, pre-op evaluation and timeout performed  Epidural  Patient position: sitting  Prep: ChloraPrep  Sedation Level: no sedation  Patient monitoring: frequent blood pressure checks, continuous pulse oximetry and heart rate  Approach: midline  Location: lumbar, L4-5  Injection technique: SIMA saline  Needle  Needle type: Tuohy   Needle gauge: 17 G  Needle insertion depth: 3.5 cm  Catheter type: multi-orifice  Catheter size: 19 G  Catheter at skin depth: 9 cm  Catheter securement method: stabilization device and clear occlusive dressing  Test dose: negativelidocaine-epinephrine (XYLOCAINE-MPF/EPINEPHRINE) 1.5 %-1:200,000 injection 3 mL - Epidural   3 mL - 2/22/2024 4:01:00 AM   2 mL - 2/22/2024 4:02:00 AM  Assessment  Sensory level: T10  Number of attempts: 1negative aspiration for CSF, negative aspiration for heme and no paresthesia on injection  patient tolerated the procedure well with no immediate complications  Additional Notes  Single skin pass. Catheter threaded easily.

## 2024-02-23 PROCEDURE — 99024 POSTOP FOLLOW-UP VISIT: CPT | Performed by: STUDENT IN AN ORGANIZED HEALTH CARE EDUCATION/TRAINING PROGRAM

## 2024-02-23 RX ORDER — IBUPROFEN 600 MG/1
600 TABLET ORAL EVERY 6 HOURS
Qty: 30 TABLET | Refills: 0 | Status: SHIPPED | OUTPATIENT
Start: 2024-02-23

## 2024-02-23 RX ORDER — ACETAMINOPHEN 325 MG/1
650 TABLET ORAL EVERY 4 HOURS PRN
Start: 2024-02-23

## 2024-02-23 NOTE — LACTATION NOTE
This note was copied from a baby's chart.  Family states baby breastfeed well after delivery but struggled to latch overnight, she states she has a hard time latching him to the left breast.     Worked on positioning infant up at chest level and starting to feed infant with nose arriving at the nipple. Then, using areolar compression to achieve a deep latch that is comfortable and exchanges optimum amounts of milk. I offered suggestions on positioning, for a more optimal latch, showed mom proper positioning, ear, shoulder hip in line, baby's arms open, not in between mom and baby, nose to nipple, hand at base of head/neck. Repeat attempts to latch, no swallow, breast compressions offered, hand expression encouraged after feeding.    Met with mother to review the Discharge Breastfeeding book, including use of the the feeding log, expected number of feedings and output; breastfeeding and your lifestyle( medications, caffeine, drugs, alcohol use); how to recognize and and remedy at home and when to call the doctor for: breast engorgement, block milked ducts and mastitis; storage and preparation of breast milk; cleaning of bottles and pump parts; paced bottle feeding and how to contact the [facility] as needed.    Baby is able to extend tongue past the lowe alveolar ridge, but is not observed to extend to lower lip, lateralizes with body, poor to moderate cup with frequent snap back, lingula frenulum attaches posterior to tongue tip, just below lower alveolar ridge.       Encouraged family to call for assistance with the next feeding.

## 2024-02-23 NOTE — PROGRESS NOTES
"Progress Note - OB/GYN   Winnie Almaraz Lopez 29 y.o. female MRN: [id number]  Unit/Bed#:  323-01 Encounter: [id number]    Assessment/plan:  29 y.o.  PPD#1 s/p  meeting appropriate postpartum milestones, doing well, anticipating discharge later today or tomorrow.    *  (spontaneous vaginal delivery)  Assessment & Plan  - Pain well controlled with oral analgesics  - Voiding spontaneously  - Moving bowels appropriately  - Tolerating PO fluids and solids  - Ambulating without difficulty  - Encourage breastfeeding and familial bonding  - Contraception: undecided       Dispo: anticipating discharge later today or tomorrow    Subjective/Objective   Chief Complaint: postpartum state    29 y.o.  PPD#1 s/p  meeting appropriate postpartum milestones, doing well this morning.  She denies complaints or concerns this morning. She is planning to breastfeed, but is undecided on contraception at this time. She prefers to go home later today if possible.      Subjective:     Pain: yes; mild cramping with breastfeeding  Tolerating PO: yes  Voiding: yes  Flatus: yes  BM: yes  Ambulating: yes  Chest pain: no  Shortness of breath: no  Leg pain: no  Lochia: wnl      Objective:     Vitals: Blood pressure (!) 83/48, pulse 81, temperature 97.5 °F (36.4 °C), temperature source Oral, resp. rate 16, height 5' 4\" (1.626 m), weight 70.3 kg (155 lb), last menstrual period 2023, SpO2 98%, currently breastfeeding.    Physical Exam:     Physical Exam  Constitutional:       Appearance: Normal appearance.   Cardiovascular:      Rate and Rhythm: Normal rate and regular rhythm.      Pulses: Normal pulses.      Heart sounds: Normal heart sounds.   Pulmonary:      Effort: Pulmonary effort is normal.      Breath sounds: Normal breath sounds.   Abdominal:      General: Abdomen is flat. Bowel sounds are normal.      Palpations: Abdomen is soft.   Musculoskeletal:         General: No tenderness.      Right lower leg: No " edema.      Left lower leg: No edema.   Skin:     General: Skin is warm and dry.      Capillary Refill: Capillary refill takes less than 2 seconds.      Findings: No erythema.   Neurological:      Mental Status: She is alert and oriented to person, place, and time.   Psychiatric:         Mood and Affect: Mood normal.         Behavior: Behavior normal.           Lab, Imaging and other studies: I have personally reviewed pertinent reports.      Lab Results   Component Value Date    WBC 15.62 (H) 02/22/2024    HGB 12.2 02/22/2024    HCT 36.3 02/22/2024    MCV 91 02/22/2024     02/22/2024               Noah Banks DO  02/23/24

## 2024-02-23 NOTE — PLAN OF CARE
Problem: POSTPARTUM  Goal: Experiences normal postpartum course  Description: INTERVENTIONS:  - Monitor maternal vital signs  - Assess uterine involution and lochia  Outcome: Progressing  Goal: Appropriate maternal -  bonding  Description: INTERVENTIONS:  - Identify family support  - Assess for appropriate maternal/infant bonding   -Encourage maternal/infant bonding opportunities  - Referral to  or  as needed  Outcome: Progressing  Goal: Establishment of infant feeding pattern  Description: INTERVENTIONS:  - Assess breast/bottle feeding  - Refer to lactation as needed  Outcome: Progressing  Goal: Incision(s), wounds(s) or drain site(s) healing without S/S of infection  Description: INTERVENTIONS  - Assess and document dressing, incision, wound bed, drain sites and surrounding tissue  - Provide patient and family education  - Perform skin care/dressing changes every   Outcome: Progressing     Problem: PAIN - ADULT  Goal: Verbalizes/displays adequate comfort level or baseline comfort level  Description: Interventions:  - Encourage patient to monitor pain and request assistance  - Assess pain using appropriate pain scale  - Administer analgesics based on type and severity of pain and evaluate response  - Implement non-pharmacological measures as appropriate and evaluate response  - Consider cultural and social influences on pain and pain management  - Notify physician/advanced practitioner if interventions unsuccessful or patient reports new pain  Outcome: Progressing     Problem: INFECTION - ADULT  Goal: Absence or prevention of progression during hospitalization  Description: INTERVENTIONS:  - Assess and monitor for signs and symptoms of infection  - Monitor lab/diagnostic results  - Monitor all insertion sites, i.e. indwelling lines, tubes, and drains  - Monitor endotracheal if appropriate and nasal secretions for changes in amount and color  - [place] appropriate cooling/warming  therapies per order  - Administer medications as ordered  - Instruct and encourage patient and family to use good hand hygiene technique  - Identify and instruct in appropriate isolation precautions for identified infection/condition  Outcome: Progressing  Goal: Absence of fever/infection during neutropenic period  Description: INTERVENTIONS:  - Monitor WBC    Outcome: Progressing     Problem: SAFETY ADULT  Goal: Patient will remain free of falls  Description: INTERVENTIONS:  - Educate patient/family on patient safety including physical limitations  - Instruct patient to call for assistance with activity   - Consult OT/PT to assist with strengthening/mobility   - Keep Call bell within reach  - Keep bed low and locked with side rails adjusted as appropriate  - Keep care items and personal belongings within reach  - Initiate and maintain comfort rounds  - Make Fall Risk Sign visible to staff  - Offer Toileting every  Hours, in advance of need  - Initiate/Maintain alarm  - Obtain necessary fall risk management equipment:   - Apply yellow socks and bracelet for high fall risk patients  - Consider moving patient to room near nurses station  Outcome: Progressing  Goal: Maintain or return to baseline ADL function  Description: INTERVENTIONS:  -  Assess patient's ability to carry out ADLs; assess patient's baseline for ADL function and identify physical deficits which impact ability to perform ADLs (bathing, care of mouth/teeth, toileting, grooming, dressing, etc.)  - Assess/evaluate cause of self-care deficits   - Assess range of motion  - Assess patient's mobility; develop plan if impaired  - Assess patient's need for assistive devices and provide as appropriate  - Encourage maximum independence but intervene and supervise when necessary  - Involve family in performance of ADLs  - Assess for home care needs following discharge   - Consider OT consult to assist with ADL evaluation and planning for discharge  - Provide  patient education as appropriate  Outcome: Progressing  Goal: Maintains/Returns to pre admission functional level  Description: INTERVENTIONS:  - Perform AM-PAC 6 Click Basic Mobility/ Daily Activity assessment daily.  - Set and communicate daily mobility goal to care team and patient/family/caregiver.   - Collaborate with rehabilitation services on mobility goals if consulted  - Perform Range of Motion  times a day.  - Reposition patient every  hours.  - Dangle patient  times a day  - Stand patient  times a day  - Ambulate patient  times a day  - Out of bed to chair  times a day   - Out of bed for meals times a day  - Out of bed for toileting  - Record patient progress and toleration of activity level   Outcome: Progressing     Problem: DISCHARGE PLANNING  Goal: Discharge to home or other facility with appropriate resources  Description: INTERVENTIONS:  - Identify barriers to discharge w/patient and caregiver  - Arrange for needed discharge resources and transportation as appropriate  - Identify discharge learning needs (meds, wound care, etc.)  - Arrange for interpretive services to assist at discharge as needed  - Refer to Case Management Department for coordinating discharge planning if the patient needs post-hospital services based on physician/advanced practitioner order or complex needs related to functional status, cognitive ability, or social support system  Outcome: Progressing     Problem: ALTERATION IN THE BREAST  Goal: Optimize infant feeding at the breast  Description: INTERVENTIONS:  - Latch, breast and nipple assessment  - Assess prior breast feeding history  - Hand expression of breast milk  - For cracked, bleeding and or sore nipples reassess latch, treat damaged nipple  -Educate mother on feeding cues  -Positioning/latch techniques  Outcome: Progressing     Problem: INADEQUATE LATCH, SUCK OR SWALLOW  Goal: Demonstrate ability to latch and sustain latch, audible swallowing and satiety  Description:  INTERVENTIONS:  - Assess oral anatomy, notify Physician/AP for abnormal findings  - Establish milk expression  - Maximize feeding opportunity (skin to skin, behavioral state)  - Position/latch techniques  - Discourage use of pacifier-artificial nipple  - Mechanical pumping  - Nipple Shield  - Supplemental formula feeding (Physician/AP order)  - Alternative feeding method  Outcome: Progressing

## 2024-02-23 NOTE — PLAN OF CARE
Problem: POSTPARTUM  Goal: Experiences normal postpartum course  Description: INTERVENTIONS:  - Monitor maternal vital signs  - Assess uterine involution and lochia  Outcome: Progressing  Goal: Appropriate maternal -  bonding  Description: INTERVENTIONS:  - Identify family support  - Assess for appropriate maternal/infant bonding   -Encourage maternal/infant bonding opportunities  - Referral to  or  as needed  Outcome: Progressing  Goal: Establishment of infant feeding pattern  Description: INTERVENTIONS:  - Assess breast/bottle feeding  - Refer to lactation as needed  Outcome: Progressing  Goal: Incision(s), wounds(s) or drain site(s) healing without S/S of infection  Description: INTERVENTIONS  - Assess and document dressing, incision, wound bed, drain sites and surrounding tissue  - Provide patient and family education  Outcome: Progressing     Problem: PAIN - ADULT  Goal: Verbalizes/displays adequate comfort level or baseline comfort level  Description: Interventions:  - Encourage patient to monitor pain and request assistance  - Assess pain using appropriate pain scale  - Administer analgesics based on type and severity of pain and evaluate response  - Implement non-pharmacological measures as appropriate and evaluate response  - Consider cultural and social influences on pain and pain management  - Notify physician/advanced practitioner if interventions unsuccessful or patient reports new pain  Outcome: Progressing     Problem: INFECTION - ADULT  Goal: Absence or prevention of progression during hospitalization  Description: INTERVENTIONS:  - Assess and monitor for signs and symptoms of infection  - Monitor lab/diagnostic results  - Monitor all insertion sites, i.e. indwelling lines, tubes, and drains  - Monitor endotracheal if appropriate and nasal secretions for changes in amount and color  - [place] appropriate cooling/warming therapies per order  - Administer medications  as ordered  - Instruct and encourage patient and family to use good hand hygiene technique  - Identify and instruct in appropriate isolation precautions for identified infection/condition  Outcome: Progressing  Goal: Absence of fever/infection during neutropenic period  Description: INTERVENTIONS:  - Monitor WBC    Outcome: Progressing     Problem: SAFETY ADULT  Goal: Patient will remain free of falls  Description: INTERVENTIONS:  - Educate patient/family on patient safety including physical limitations  - Instruct patient to call for assistance with activity   - Consult OT/PT to assist with strengthening/mobility   - Keep Call bell within reach  - Keep bed low and locked with side rails adjusted as appropriate  - Keep care items and personal belongings within reach  - Initiate and maintain comfort rounds  - Apply yellow socks and bracelet for high fall risk patients  - Consider moving patient to room near nurses station  Outcome: Progressing  Goal: Maintain or return to baseline ADL function  Description: INTERVENTIONS:  -  Assess patient's ability to carry out ADLs; assess patient's baseline for ADL function and identify physical deficits which impact ability to perform ADLs (bathing, care of mouth/teeth, toileting, grooming, dressing, etc.)  - Assess/evaluate cause of self-care deficits   - Assess range of motion  - Assess patient's mobility; develop plan if impaired  - Assess patient's need for assistive devices and provide as appropriate  - Encourage maximum independence but intervene and supervise when necessary  - Involve family in performance of ADLs  - Assess for home care needs following discharge   - Consider OT consult to assist with ADL evaluation and planning for discharge  - Provide patient education as appropriate  Outcome: Progressing  Goal: Maintains/Returns to pre admission functional level  Description: INTERVENTIONS:  - Perform AM-PAC 6 Click Basic Mobility/ Daily Activity assessment daily.  -  Set and communicate daily mobility goal to care team and patient/family/caregiver.   - Out of bed for toileting  - Record patient progress and toleration of activity level   Outcome: Progressing     Problem: DISCHARGE PLANNING  Goal: Discharge to home or other facility with appropriate resources  Description: INTERVENTIONS:  - Identify barriers to discharge w/patient and caregiver  - Arrange for needed discharge resources and transportation as appropriate  - Identify discharge learning needs (meds, wound care, etc.)  - Arrange for interpretive services to assist at discharge as needed  - Refer to Case Management Department for coordinating discharge planning if the patient needs post-hospital services based on physician/advanced practitioner order or complex needs related to functional status, cognitive ability, or social support system  Outcome: Progressing     Problem: ALTERATION IN THE BREAST  Goal: Optimize infant feeding at the breast  Description: INTERVENTIONS:  - Latch, breast and nipple assessment  - Assess prior breast feeding history  - Hand expression of breast milk  - For cracked, bleeding and or sore nipples reassess latch, treat damaged nipple  -Educate mother on feeding cues  -Positioning/latch techniques  Outcome: Progressing     Problem: INADEQUATE LATCH, SUCK OR SWALLOW  Goal: Demonstrate ability to latch and sustain latch, audible swallowing and satiety  Description: INTERVENTIONS:  - Assess oral anatomy, notify Physician/AP for abnormal findings  - Establish milk expression  - Maximize feeding opportunity (skin to skin, behavioral state)  - Position/latch techniques  - Discourage use of pacifier-artificial nipple  - Mechanical pumping  - Nipple Shield  - Supplemental formula feeding (Physician/AP order)  - Alternative feeding method  Outcome: Progressing

## 2024-02-24 VITALS
HEIGHT: 64 IN | DIASTOLIC BLOOD PRESSURE: 77 MMHG | TEMPERATURE: 98 F | HEART RATE: 81 BPM | BODY MASS INDEX: 26.46 KG/M2 | WEIGHT: 155 LBS | SYSTOLIC BLOOD PRESSURE: 120 MMHG | OXYGEN SATURATION: 97 % | RESPIRATION RATE: 20 BRPM

## 2024-02-24 LAB — PLACENTA IN STORAGE: NORMAL

## 2024-02-24 PROCEDURE — NC001 PR NO CHARGE: Performed by: STUDENT IN AN ORGANIZED HEALTH CARE EDUCATION/TRAINING PROGRAM

## 2024-02-24 PROCEDURE — 99024 POSTOP FOLLOW-UP VISIT: CPT | Performed by: STUDENT IN AN ORGANIZED HEALTH CARE EDUCATION/TRAINING PROGRAM

## 2024-02-24 NOTE — LACTATION NOTE
This note was copied from a baby's chart.  Met with Winnie who is exclusively breastfeeding her baby boy and set to be discharged today from the hospital. Winnie states breastfeeding is going well and baby nursed more often last night than the first night of life.     Reviewed feeding frequency of 2-3 hours or on demand and waking baby for feeds during the first month of life. Information on cluster feeding and periods of growth spurts within the first few months shared.     Mom expressed some nipple soreness and upon assessment presented with cracked/scabbed nipples. Advised use of lanolin with telfa pads while in the hospital, use of wax paper squares as a non occlusive dressing at home, or hand expression and leaving colostrum/mature milk to dry on the nipples in open air. Reviewed not needing to wash her nipples after feedings or lanolin use, and that smell of her nipples will calm baby and entice baby to eat at each feeding.     Encouraged mom to position baby snugly to the breast with both cheeks touching the breast to ensure a deep latch. Initial latch on pain can last up to a minute, but any longer can signal a shallow latch, which can cause damage to her nipples. If the latch looks or feels shallow, Winnie can use her finger in the baby's mouth to break the suction of baby's mouth and relatch baby deeper.     Reviewed where to find answers to any breastfeeding questions in the Ready Set Baby and Discharge Breastfeeding Booklets, including the telephone number for the [facility] for postpartum lactation support outpatient.

## 2024-02-24 NOTE — PROGRESS NOTES
Progress Note - OB/GYN   Winnie Lopez 29 y.o. female MRN: [id number]  Unit/Bed#:  323-01 Encounter: [id number]      Assessment/Plan    Winnie Lopez is a 29 y.o.  who is PPD 2 s/p  at 40w4d     *  (spontaneous vaginal delivery)  Assessment & Plan  - Pain well controlled with oral analgesics  - Voiding spontaneously  - Moving bowels appropriately  - Tolerating PO fluids and solids  - Ambulating without difficulty  - Encourage breastfeeding and familial bonding  - Contraception: undecided           Disposition: Anticipate discharge home postpartum Day #2  Barriers to discharge: none       Subjective/Objective     Subjective: Postpartum state    Pain: no  Tolerating PO: yes  Voiding: yes  Flatus: yes  BM: yes  Ambulating: yes  Breastfeeding: Breastfeeding  Chest pain: no  Shortness of breath: no  Leg pain: no  Lochia: wnl    Objective:     Vitals:  Vitals:    24 0419 24 0904 24 1636 24 0036   BP: (!) 83/48 103/63 121/72 98/54   BP Location: Right arm  Right arm    Pulse: 81 76 80 69   Resp: 16 20 18 18   Temp: 97.5 °F (36.4 °C) 98.4 °F (36.9 °C) 98.4 °F (36.9 °C) 97.6 °F (36.4 °C)   TempSrc: Oral Oral Oral Oral   SpO2: 98%  99% 97%   Weight:       Height:           Physical Exam:   GEN: appears well, alert and oriented x 3, pleasant and cooperative   CV: Regular rate  RESP: non labored breathing  ABDOMEN: soft, no tenderness, no distention, Uterine fundus firm and non-tender, -1 cm below the umbilicus  EXTREMITIES: non-tender  NEURO Alert and oriented to person, place, and time.       Lab Results   Component Value Date    WBC 15.62 (H) 2024    HGB 12.2 2024    HCT 36.3 2024    MCV 91 2024     2024         Gemma Tobias DO  Obstetrics & Gynecology  24

## 2024-02-24 NOTE — PLAN OF CARE
Problem: POSTPARTUM  Goal: Experiences normal postpartum course  Description: INTERVENTIONS:  - Monitor maternal vital signs  - Assess uterine involution and lochia  Outcome: Progressing  Goal: Appropriate maternal -  bonding  Description: INTERVENTIONS:  - Identify family support  - Assess for appropriate maternal/infant bonding   -Encourage maternal/infant bonding opportunities  - Referral to  or  as needed  Outcome: Progressing  Goal: Establishment of infant feeding pattern  Description: INTERVENTIONS:  - Assess breast/bottle feeding  - Refer to lactation as needed  Outcome: Progressing  Goal: Incision(s), wounds(s) or drain site(s) healing without S/S of infection  Description: INTERVENTIONS  - Assess and document dressing, incision, wound bed, drain sites and surrounding tissue  - Provide patient and family education  - Perform skin care/dressing changes every   Outcome: Progressing     Problem: PAIN - ADULT  Goal: Verbalizes/displays adequate comfort level or baseline comfort level  Description: Interventions:  - Encourage patient to monitor pain and request assistance  - Assess pain using appropriate pain scale  - Administer analgesics based on type and severity of pain and evaluate response  - Implement non-pharmacological measures as appropriate and evaluate response  - Consider cultural and social influences on pain and pain management  - Notify physician/advanced practitioner if interventions unsuccessful or patient reports new pain  Outcome: Progressing     Problem: INFECTION - ADULT  Goal: Absence or prevention of progression during hospitalization  Description: INTERVENTIONS:  - Assess and monitor for signs and symptoms of infection  - Monitor lab/diagnostic results  - Monitor all insertion sites, i.e. indwelling lines, tubes, and drains  - Monitor endotracheal if appropriate and nasal secretions for changes in amount and color  - [place] appropriate cooling/warming  therapies per order  - Administer medications as ordered  - Instruct and encourage patient and family to use good hand hygiene technique  - Identify and instruct in appropriate isolation precautions for identified infection/condition  Outcome: Progressing  Goal: Absence of fever/infection during neutropenic period  Description: INTERVENTIONS:  - Monitor WBC    Outcome: Progressing     Problem: SAFETY ADULT  Goal: Patient will remain free of falls  Description: INTERVENTIONS:  - Educate patient/family on patient safety including physical limitations  - Instruct patient to call for assistance with activity   - Consult OT/PT to assist with strengthening/mobility   - Keep Call bell within reach  - Keep bed low and locked with side rails adjusted as appropriate  - Keep care items and personal belongings within reach  - Initiate and maintain comfort rounds  - Make Fall Risk Sign visible to staff  - Offer Toileting every  Hours, in advance of need  - Initiate/Maintain alarm  - Obtain necessary fall risk management equipment:   - Apply yellow socks and bracelet for high fall risk patients  - Consider moving patient to room near nurses station  Outcome: Progressing  Goal: Maintain or return to baseline ADL function  Description: INTERVENTIONS:  -  Assess patient's ability to carry out ADLs; assess patient's baseline for ADL function and identify physical deficits which impact ability to perform ADLs (bathing, care of mouth/teeth, toileting, grooming, dressing, etc.)  - Assess/evaluate cause of self-care deficits   - Assess range of motion  - Assess patient's mobility; develop plan if impaired  - Assess patient's need for assistive devices and provide as appropriate  - Encourage maximum independence but intervene and supervise when necessary  - Involve family in performance of ADLs  - Assess for home care needs following discharge   - Consider OT consult to assist with ADL evaluation and planning for discharge  - Provide  patient education as appropriate  Outcome: Progressing  Goal: Maintains/Returns to pre admission functional level  Description: INTERVENTIONS:  - Perform AM-PAC 6 Click Basic Mobility/ Daily Activity assessment daily.  - Set and communicate daily mobility goal to care team and patient/family/caregiver.   - Collaborate with rehabilitation services on mobility goals if consulted  - Perform Range of Motion  times a day.  - Reposition patient every  hours.  - Dangle patient  times a day  - Stand patient  times a day  - Ambulate patient  times a day  - Out of bed to chair  times a day   - Out of bed for meals  times a day  - Out of bed for toileting  - Record patient progress and toleration of activity level   Outcome: Progressing     Problem: DISCHARGE PLANNING  Goal: Discharge to home or other facility with appropriate resources  Description: INTERVENTIONS:  - Identify barriers to discharge w/patient and caregiver  - Arrange for needed discharge resources and transportation as appropriate  - Identify discharge learning needs (meds, wound care, etc.)  - Arrange for interpretive services to assist at discharge as needed  - Refer to Case Management Department for coordinating discharge planning if the patient needs post-hospital services based on physician/advanced practitioner order or complex needs related to functional status, cognitive ability, or social support system  Outcome: Progressing     Problem: ALTERATION IN THE BREAST  Goal: Optimize infant feeding at the breast  Description: INTERVENTIONS:  - Latch, breast and nipple assessment  - Assess prior breast feeding history  - Hand expression of breast milk  - For cracked, bleeding and or sore nipples reassess latch, treat damaged nipple  -Educate mother on feeding cues  -Positioning/latch techniques  Outcome: Progressing     Problem: INADEQUATE LATCH, SUCK OR SWALLOW  Goal: Demonstrate ability to latch and sustain latch, audible swallowing and  satiety  Description: INTERVENTIONS:  - Assess oral anatomy, notify Physician/AP for abnormal findings  - Establish milk expression  - Maximize feeding opportunity (skin to skin, behavioral state)  - Position/latch techniques  - Discourage use of pacifier-artificial nipple  - Mechanical pumping  - Nipple Shield  - Supplemental formula feeding (Physician/AP order)  - Alternative feeding method  Outcome: Progressing

## 2024-02-24 NOTE — DISCHARGE SUMMARY
Discharge Summary - OB/GYN  Winnie Lopez 29 y.o. female MRN: [id number]  Unit/Bed#: -01 Encounter: [id number]        Admission Date: 2024      Discharge Date: 2024     Admitting Attending: Reinier Gerber Attending: Osman     Discharging Attending: Heron     Principal Diagnosis: Pregnancy at 40w4d     Secondary Diagnosis:   1. GBS bacteruria   2. Normal labor     Procedures: spontaneous vaginal delivery     Anesthesia: epidural     Hospital course: Ms. Winnie Lopez is a 29 y.o.  at 40w4d. She presented to labor and delivery for contractions and was admitted for labor. Her pregnancy was complicated by GBS bacteruria. On exam in triage she was noted to be 3.5/90/-1. She was expectantly managed and made change on her own to 6/90/-1. Amniotomy was performed for clear fluid that later was stained with thin meconium and NICU was notified. Pitocin was started for protracted dilation, but fetus started having recurrent variable decelerations a Pitocin was turned off.  An IUPC was placed and amnioinfusion was started.  An FSE was later placed to better track baby.  She progressed to complete and started pushing.     She delivered a viable male  on 2024 at 1515. Weight (7 lb 1.9 oz  via normal spontaneous vaginal delivery. She sustained no lacerations during delivery which was adequately repaired. Apgars were 9 (1 min) and 9 (5 min).  was transferred to  nursery. Patient tolerated the procedure well.      Her post-delivery course was uncomplicated. Her postpartum pain was well controlled with oral analgesics.     On day of discharge, she was ambulating and able to reasonably perform all ADLs. She was voiding and had appropriate bowel function. Pain was well controlled. She was discharged home on postpartum day #2 without complications. Patient was instructed to follow up with her OB as an outpatient and was given  appropriate warnings to call provider if she develops signs of infection or uncontrolled pain.     Complications: none apparent     Condition at discharge: stable      Discharge instructions/Information to patient and family:   See after visit summary for information provided to patient and family.       Provisions for Follow-Up Care:  See after visit summary for information related to follow-up care and any pertinent home health orders.       Disposition: See After Visit Summary for discharge disposition information.     Planned Readmission: No     Discharge medications and instructions:   Please see AVS for full list of medications upon discharge.    Gemma Tobias DO PGY-1  02/24/24  7:01 AM

## 2024-02-26 ENCOUNTER — CLINICAL SUPPORT (OUTPATIENT)
Dept: POSTPARTUM | Facility: CLINIC | Age: 29
End: 2024-02-26

## 2024-02-26 DIAGNOSIS — Z32.2 ENCOUNTER FOR CHILDBIRTH INSTRUCTION: Primary | ICD-10-CM

## 2024-02-28 ENCOUNTER — TELEPHONE (OUTPATIENT)
Age: 29
End: 2024-02-28

## 2024-02-28 NOTE — TELEPHONE ENCOUNTER
Patient called to schedule Postpartum visit (delivered 2/22). Searched for all providers at [facility] location. Earliest appt is 3/26 at 8:30 with Raquel at [facility] but is outside of the 3 wk PP timeframe. Please review if an earlier appt can be scheduled. Thank you.

## 2024-03-06 ENCOUNTER — TELEPHONE (OUTPATIENT)
Dept: OBGYN CLINIC | Facility: CLINIC | Age: 29
End: 2024-03-06

## 2024-03-06 NOTE — TELEPHONE ENCOUNTER
Routine postpartum call to patient. L/M for patient. PP visit scheduled for 3/6. Routed to clerical pool to facilitate earlier appt. Advised patient to call back with any questions or concerns.

## 2024-03-14 ENCOUNTER — OFFICE VISIT (OUTPATIENT)
Dept: FAMILY MEDICINE CLINIC | Facility: CLINIC | Age: 29
End: 2024-03-14
Payer: COMMERCIAL

## 2024-03-14 VITALS
DIASTOLIC BLOOD PRESSURE: 62 MMHG | HEIGHT: 64 IN | SYSTOLIC BLOOD PRESSURE: 90 MMHG | RESPIRATION RATE: 14 BRPM | HEART RATE: 78 BPM | BODY MASS INDEX: 23.15 KG/M2 | WEIGHT: 135.6 LBS | TEMPERATURE: 98.1 F | OXYGEN SATURATION: 98 %

## 2024-03-14 DIAGNOSIS — R10.9 FLANK PAIN: Primary | ICD-10-CM

## 2024-03-14 DIAGNOSIS — M54.50 CHRONIC MIDLINE LOW BACK PAIN WITHOUT SCIATICA: ICD-10-CM

## 2024-03-14 DIAGNOSIS — G89.29 CHRONIC MIDLINE LOW BACK PAIN WITHOUT SCIATICA: ICD-10-CM

## 2024-03-14 LAB
BACTERIA UR QL AUTO: ABNORMAL /HPF
BILIRUB UR QL STRIP: NEGATIVE
CLARITY UR: CLEAR
COLOR UR: ABNORMAL
GLUCOSE UR STRIP-MCNC: NEGATIVE MG/DL
HGB UR QL STRIP.AUTO: NEGATIVE
HYALINE CASTS #/AREA URNS LPF: ABNORMAL /LPF
KETONES UR STRIP-MCNC: NEGATIVE MG/DL
LEUKOCYTE ESTERASE UR QL STRIP: ABNORMAL
MUCOUS THREADS UR QL AUTO: ABNORMAL
NITRITE UR QL STRIP: NEGATIVE
NON-SQ EPI CELLS URNS QL MICRO: ABNORMAL /HPF
PH UR STRIP.AUTO: 6 [PH]
PROT UR STRIP-MCNC: ABNORMAL MG/DL
RBC #/AREA URNS AUTO: ABNORMAL /HPF
SL AMB  POCT GLUCOSE, UA: ABNORMAL
SL AMB LEUKOCYTE ESTERASE,UA: ABNORMAL
SL AMB POCT BILIRUBIN,UA: ABNORMAL
SL AMB POCT BLOOD,UA: ABNORMAL
SL AMB POCT CLARITY,UA: CLEAR
SL AMB POCT COLOR,UA: YELLOW
SL AMB POCT KETONES,UA: ABNORMAL
SL AMB POCT NITRITE,UA: ABNORMAL
SL AMB POCT PH,UA: 6
SL AMB POCT SPECIFIC GRAVITY,UA: 1.02
SL AMB POCT URINE PROTEIN: ABNORMAL
SL AMB POCT UROBILINOGEN: 0.2
SP GR UR STRIP.AUTO: 1.01 (ref 1–1.03)
UROBILINOGEN UR STRIP-ACNC: <2 MG/DL
WBC #/AREA URNS AUTO: ABNORMAL /HPF

## 2024-03-14 PROCEDURE — 99213 OFFICE O/P EST LOW 20 MIN: CPT | Performed by: NURSE PRACTITIONER

## 2024-03-14 PROCEDURE — 81002 URINALYSIS NONAUTO W/O SCOPE: CPT | Performed by: NURSE PRACTITIONER

## 2024-03-14 PROCEDURE — 87086 URINE CULTURE/COLONY COUNT: CPT | Performed by: NURSE PRACTITIONER

## 2024-03-14 PROCEDURE — 81001 URINALYSIS AUTO W/SCOPE: CPT | Performed by: NURSE PRACTITIONER

## 2024-03-14 NOTE — ASSESSMENT & PLAN NOTE
And has lower back pain, sacrum.  Denies any injury . reports it is very tender.  Has a history of sacrum tenderness prior to pregnancy.  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  May use donut cushion.  May use heat.  May use muscle rub change position.

## 2024-03-14 NOTE — PROGRESS NOTES
Name: Winnie Almaraz Lopez      : 1995      MRN: [id number]  Encounter Provider: DON Rivero  Encounter Date: 3/14/2024   Encounter department: [facility] PRIMARY CARE    Assessment & Plan     1. Flank pain  Assessment & Plan:  UA in office was positive for leukocytes.  Will send for culture.    Orders:  -     POCT urine dip  -     UA w Reflex to Microscopic w Reflex to Culture; Future  -     UA w Reflex to Microscopic w Reflex to Culture    2. Chronic midline low back pain without sciatica  Assessment & Plan:  And has lower back pain, sacrum.  Denies any injury . reports it is very tender.  Has a history of sacrum tenderness prior to pregnancy.  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  May use donut cushion.  May use heat.  May use muscle rub change position.          Depression Screening and Follow-up Plan: Patient was screened for depression during today's encounter. They screened negative with a PHQ-2 score of 0.        Subjective      Is here with complaints of lower back pain.  Reports that she has had problems with her tailbone prior to pregnancy.  Was a dancer.  Feels very tender to touch.      Review of Systems   Constitutional: Negative.    HENT: Negative.     Eyes: Negative.    Respiratory: Negative.     Cardiovascular: Negative.    Gastrointestinal: Negative.    Endocrine: Negative.    Genitourinary: Negative.  Negative for flank pain.   Musculoskeletal:  Positive for back pain.   Skin: Negative.    Allergic/Immunologic: Negative.    Neurological: Negative.    Psychiatric/Behavioral: Negative.         Current Outpatient Medications on File Prior to Visit   Medication Sig   • Prenatal Vit-Fe Fumarate-FA (PRENATAL VITAMIN PO) Take by mouth   • acetaminophen (TYLENOL) 325 mg tablet Take 2 tablets (650 mg total) by mouth every 4 (four) hours as needed for mild pain, headaches or fever (Patient not taking: Reported on 3/14/2024)   • ibuprofen (MOTRIN) 600 mg tablet Take  "1 tablet (600 mg total) by mouth every 6 (six) hours (Patient not taking: Reported on 3/14/2024)       Objective     BP 90/62   Pulse 78   Temp 98.1 °F (36.7 °C) (Temporal)   Resp 14   Ht 5' 4\" (1.626 m)   Wt 61.5 kg (135 lb 9.6 oz)   LMP 05/02/2023   SpO2 98%   BMI 23.28 kg/m²     Physical Exam  Vitals and nursing note reviewed.   Constitutional:       Appearance: Normal appearance. She is well-developed.   HENT:      Head: Normocephalic and atraumatic.   Cardiovascular:      Rate and Rhythm: Normal rate and regular rhythm.      Pulses: Normal pulses.      Heart sounds: Normal heart sounds.   Pulmonary:      Effort: Pulmonary effort is normal.   Abdominal:      Tenderness: There is no right CVA tenderness or left CVA tenderness.   Musculoskeletal:         General: Tenderness (Sacrum) present. Normal range of motion.      Cervical back: Normal range of motion.   Skin:     General: Skin is warm and dry.   Neurological:      Mental Status: She is alert and oriented to person, place, and time.   Psychiatric:         Mood and Affect: Mood normal.         Behavior: Behavior normal.         Thought Content: Thought content normal.         Judgment: Judgment normal.       DNO Rivero    "

## 2024-03-15 LAB — BACTERIA UR CULT: NORMAL

## 2024-03-25 PROBLEM — Z3A.40 40 WEEKS GESTATION OF PREGNANCY: Status: RESOLVED | Noted: 2023-08-10 | Resolved: 2024-03-25

## 2024-03-25 PROBLEM — O42.90 PREMATURE RUPTURE OF MEMBRANES: Status: RESOLVED | Noted: 2024-02-20 | Resolved: 2024-03-25

## 2024-03-25 PROBLEM — R82.71 GBS BACTERIURIA: Status: RESOLVED | Noted: 2023-08-18 | Resolved: 2024-03-25

## 2024-03-25 NOTE — PROGRESS NOTES
"Postpartum Note  Winnie Almaraz Lopez 29 y.o. @ [id number]          Subjective: Pleasant 29 y.o. here for postpartum exam. EDPS of 0. Substance screen no risk. The baby BOY, Ketan, and mom are doing well. 100% Breastfeeding.  on 24. No lacerations at delivery. No complaints offered other than right breast pain x 1 day and rectal pressure since delivering the baby and only with a bm. BCM method of choice is declined. She used Rhythm prior to pregnancy, advised she may have Lactational Amenorrhea but we will discuss again in 3 months Patient denies fever, pelvic pain or bleeding issues.    History reviewed. No pertinent past medical history.  Past Surgical History:   Procedure Laterality Date    LASIK Bilateral      Patient Active Problem List    Diagnosis Date Noted    Chronic midline low back pain without sciatica 2024    Flank pain 2024     (spontaneous vaginal delivery) 2024     No Known Allergies  OB History    Para Term  AB Living   2 1 1   1 1   SAB IAB Ectopic Multiple Live Births         0 1      # Outcome Date GA Lbr Akira/2nd Weight Sex Delivery Anes PTL Lv   2 Term 24 40w4d / 00:36 3230 g (7 lb 1.9 oz) M Vag-Spont EPI N TEOFILO   1 AB                    Meds:   Current Outpatient Medications:     hydrocortisone-pramoxine (PROCTOFOAM-HC) 1-1 % FOAM rectal foam, Insert 1 applicator into the rectum 2 (two) times a day for 28 days, Disp: 10 g, Rfl: 1    Prenatal Vit-Fe Fumarate-FA (PRENATAL VITAMIN PO), Take by mouth, Disp: , Rfl:     acetaminophen (TYLENOL) 325 mg tablet, Take 2 tablets (650 mg total) by mouth every 4 (four) hours as needed for mild pain, headaches or fever (Patient not taking: Reported on 3/14/2024), Disp: , Rfl:     ibuprofen (MOTRIN) 600 mg tablet, Take 1 tablet (600 mg total) by mouth every 6 (six) hours (Patient not taking: Reported on 3/14/2024), Disp: 30 tablet, Rfl: 0    Vitals: Blood pressure 104/70, height 5' 4\" (1.626 m), " weight 61 kg (134 lb 6.4 oz), currently breastfeeding.,Body mass index is 23.07 kg/m².      Review of Systems:  CONSTITUTIONALno weight loss, fever, night sweats and feels well  CVS: denies chest pain, chest pressure/discomfort, dyspnea, irregular heart beat, lower extremity edema and palpitations  RESP:no cough, shortness of breath, or wheezing  GI:Normal BM's, denies hematochezia, melena or pain.  :Denies: dysuria, frequency/urgency, hematuria, heavy menses, pelvic pain  VAGINAL BLEEDING: occasional staining only    PHYSICAL EXAM:    GEN: Winnie Almaraz Lopez appears well, alert and oriented x 3, pleasant and cooperative   NECK: supple, no thyromegaly  LUNGS: normal respiratory effort   ABDOMEN: soft, nondistended  EXTREMITIES: no edema  NEURO: no focal findings   BREASTS: bilaterally no masses, no nipple discharge, no skin changes, warm and soft bilaterally, no areas of redness noted  especially at tender site  GYN: external genitalia wnl, no lesions, no rashes, no erythema            Uterus: normal size, nontender, mobile            Cervix closed, no discharge            Adnexa: nontender, nonpalpable bilaterally                  Assessment: Normal Postpartum Exam                           Problem List Items Addressed This Visit          Obstetrics/Gynecology     (spontaneous vaginal delivery) (Chronic)     Other Visit Diagnoses       Postpartum exam    -  Primary    Rectal pressure        Relevant Medications    hydrocortisone-pramoxine (PROCTOFOAM-HC) 1-1 % FOAM rectal foam              Plan:  Contraceptive method of choice is declined.  Return for annual in 3 months.  Encouraged Kegel exercises.  Advised abstinence for the next 2 weeks.  Full activity after 6 weeks postpartum.

## 2024-03-25 NOTE — PATIENT INSTRUCTIONS
Self Care After Delivery   AMBULATORY CARE:   The postpartum period  is the period of time from delivery to about 6 weeks. During this time you may experience many physical and emotional changes. It is important to understand what is normal and when you need to call your healthcare provider. It is also important to know how to care for yourself during this time.  Call your local emergency number (911 in the [place]) for any of the following:   You see or hear things that are not there, or have thoughts of harming yourself or your baby.    You soak through 1 pad in 15 minutes, have blurry vision, clammy or pale skin, and feel faint.    You faint or lose consciousness.    You have trouble breathing.    You cough up blood.    Your  incision comes apart.    Seek care immediately if:   Your heart is beating faster than usual.    You have a bad headache or changes in your vision.    Your episiotomy or  incision is red, swollen, bleeding, or draining pus.    You have severe abdominal pain.    Call your doctor or obstetrician if:   Your leg is painful, red, and larger than usual.    You soak through 1 or more pads in an hour, or pass blood clots larger than a quarter from your vagina.    You have a fever.    You have new or worsening pain in your abdomen or vagina.    You continue to have depression 1 to 2 weeks after you deliver.    You have trouble sleeping.    You have foul-smelling discharge from your vagina.    You have pain or burning when you urinate.    You do not have a bowel movement for 3 days or more.    You have nausea or are vomiting.    You have hard lumps or red streaks over your breasts.    You have cracked nipples or bleed from your nipples.    You have questions or concerns about your condition or care.    Physical changes:  The following are normal changes after you give birth:  Pain in the area between your anus and vagina    Breast pain    Constipation or hemorrhoids    Hot or cold  flashes    Vaginal bleeding or discharge    Mild to moderate abdominal cramping    Difficulty controlling bowel movements or urine    Emotional changes:  A drop in hormone levels after you deliver may cause changes in your emotions. You may feel irritable, sad, or anxious. You may cry easily or for no reason. You may also feel depressed. Depression that continues can be a sign of postpartum depression, a condition that can be treated. Treatment may include talk therapy, medicines, or both. Healthcare providers will ask how you are feeling and if you have any depression. These talks can happen during appointments for your medical care and for your baby's care, such as well child visits. Providers can help you find ways to care for yourself and your baby. Talk to your providers about the following:  When emotional changes or depression started, and if it is getting worse over time    Problems you are having with daily activities, sleep, or caring for your baby    If anything makes you feel worse, or makes you feel better    Feeling that you are not bonding with your baby the way you want    Any problems your baby has with sleeping or feeding    Your baby is fussy or cries a lot    Support you have from friends, family, or others    Breast care for breastfeeding mothers:  You may have sore breasts for 3 to 6 days after you give birth. This happens as your milk begins to fill your breasts. You may also have sore breasts if you do not breastfeed frequently. Do the following to care for your breasts:  Apply a moist, warm, compress to your breast as directed.  This may help soothe your breasts. Make sure the washcloth is not too hot before you apply it to your breast.    Nurse your baby or pump your milk frequently.  This may prevent clogged milk ducts. Ask your healthcare provider how often to nurse or pump.    Massage your breasts as directed.  This may help increase your milk flow. Gently rub your breasts in a circular  motion before you breastfeed. You may need to gently squeeze your breast or nipple to help release milk. You can also use a breast pump to help release milk from your breast.    Wash your breasts with warm water only.  Do not put soap on your nipples. Soap may cause your nipples to become dry.    Apply lanolin cream to your nipples as directed.  Lanolin cream may add moisture to your skin and prevent nipple dryness. Always  wash off lanolin cream with warm water before you breastfeed.    Place pads in your bra.  Your nipples may leak milk when you are not breastfeeding. You can place pads inside of your bra to help prevent leaking onto your clothing. Ask your healthcare provider where to purchase bra pads.    Get breastfeeding support if needed.  Healthcare providers can answer questions about breastfeeding and provide you with support. Ask your healthcare provider who you can contact if you need breastfeeding support.    Breast care for non-breastfeeding mothers:  Milk will fill your breasts even if you bottle feed your baby. Do the following to help stop your milk from filling your breasts and causing pain:  Wear a bra with support at all times.  A sports bra or a tight-fitting bra will help stop your milk from coming in.    Apply ice on each breast for 15 to 20 minutes every hour or as directed.  Use an ice pack, or put crushed ice in a plastic bag. Cover it with a towel before you apply it to your breast. Ice helps your milk ducts shrink.    Keep your breasts away from warm water.  Warm water will make it easier for milk to fill your breasts. Stand with your breasts away from warm water in the shower.    Limit how much you touch your breasts.  This will prevent them from filling with milk.    Perineum care:  Your perineum is the area between your rectum and vagina. It is normal to have swelling and pain in this area after you give birth. If you had an episiotomy, your healthcare provider may give you special  instructions.  Clean your perineum after you use the bathroom.  This may prevent infection and help with healing. Use a spray bottle with warm water to clean your perineum. You may also gently spray warm water against your perineum when you urinate. Always wipe front to back.    Take a sitz bath as directed.  A sitz bath may help relieve swelling and pain. Fill your bath tub or bucket with water up to your hips and sit in the water. Use cold water for 2 days after you deliver. Then use warm water. Ask your healthcare provider for more information about a sitz bath.    Apply ice packs for the first 24 hours or as directed.  Use a plastic glove filled with ice or buy an ice pack. Wrap the ice pack or plastic glove in a small towel or wash cloth. Place the ice pack on your perineum for 20 minutes at a time.    Sit on a donut-shaped pillow.  This may relieve pressure on your perineum when you sit.    Use wipes that contain medicine or take pills as directed.  Your healthcare provider may tell you to use witch hazel pads. You can place witch hazel pads in the refrigerator before you apply them to your perineum. Your provider may also tell you to take NSAIDs. Ask him or her how often to take pills or use the wipes.    Do not go swimming or take tub baths for 4 to 6 weeks or as directed.  This will help prevent an infection in your vagina or uterus.    Bowel and bladder care:  It may take 3 to 5 days to have a bowel movement after you deliver your baby. You can do the following to prevent or manage constipation, and get control of your bowel or bladder:  Take stool softeners as directed.  A stool softener is medicine that will make your bowel movements softer. This may prevent or relieve constipation. A stool softener may also make bowel movements less painful.    Drink plenty of liquids.  Ask how much liquid to drink each day and which liquids are best for you. Liquids may help prevent constipation.    Eat foods high in  fiber.  Examples include fruits, vegetables, grains, beans, and lentils. Ask your healthcare provider how much fiber you need each day. Fiber may prevent constipation.         Do Kegel exercises as directed.  Kegel exercises will help strengthen the muscles that control bowel movements and urination. Ask your healthcare provider for more information on Kegel exercises.    Apply cold compresses or medicine to hemorrhoids as directed.  This may relieve swelling and pain. Your healthcare provider may tell you to apply ice or wipes that contain medicine to your hemorrhoids. He or she may also tell you to use a sitz bath. Ask your provider for more information on how to manage hemorrhoids.    Nutrition:  Good nutrition is important in the postpartum period. It will help you return to a healthy weight, increase your energy levels, and prevent constipation. It will also help you get enough nutrients and calories if you are going to breastfeed your baby.  Eat a variety of healthy foods.  Healthy foods include fruits, vegetables, whole-grain breads, low-fat dairy products, beans, lean meats, and fish. You may need 500 to 700 extra calories each day if you breastfeed your baby. You may also need extra protein.         Limit foods with added sugar and high amounts of fat.  These foods are high in calories and low in healthy nutrients. Read food labels so you know how much sugar and fat is in the food you want to eat.    Drink 8 to 10 glasses of water per day.  Water will help you make plenty of milk for your baby. It will also help prevent constipation. Drink a glass of water every time you breastfeed your baby.    Take vitamins as directed.  Ask your healthcare provider what vitamins you need.    Limit caffeine and alcohol if you are breastfeeding.  Caffeine and alcohol can get into your breast milk. Caffeine and alcohol can make your baby fussy. They can also interfere with your baby's sleep. Ask your healthcare provider if  you can drink alcohol or caffeine.    Rest and sleep:  You may feel very tired in the postpartum period. Enough sleep will help you heal and give you energy to care for your baby. The following may help you get sleep and rest:  Nap when your baby naps.  Your baby may nap several times during the day. Get rest during this time.    Limit visitors.  Many people may want to see you and your baby. Ask friends or family to visit on different days. This will give you time to rest.    Do not plan too much for one day.  Put off household chores so that you have time to rest. Gradually do more each day.    Ask for help from family, friends, or neighbors.  Ask them to help you with laundry, cleaning, or errands. Also ask someone to watch the baby while you take a nap or relax. Ask your partner to help with the care of your baby. Pump some of your breast milk so your partner can feed your baby during the night.    Exercise after delivery:  Wait until your healthcare provider says it is okay to exercise. Exercise can help you lose weight, increase your energy levels, and manage your mood. It can also prevent constipation and blood clots. Start with gentle exercises such as walking. Do more as you have more energy. You may need to avoid abdominal exercises for 1 to 2 weeks after you deliver. Talk to your healthcare provider about an exercise plan that is right for you.       Sexual activity after delivery:   Do not have sex until your healthcare provider says it is okay. You may need to wait 4 to 6 weeks before you have sex. This may prevent infection and allow time to heal.    Your menstrual cycle may begin as soon as 3 weeks after you deliver. Your period may be delayed if you breastfeed your baby. You can become pregnant before you get your first postpartum period. Talk to your healthcare provider about birth control that is right for you. Some types of birth control are not safe during breastfeeding.    For support and more  information:  Join a support group for new mothers. Ask for help from family and friends with chores, errands, and care of your baby.  Office of Women's Health,  Department of Health and Human Services  [address] [place] [address] [address] [place] [address]  [address] [facility] [address], [place] [address]  Phone: [phone]  Web Address: www.womenshealth.gov  [facility]partum [facility]  [address] [place] [address]  Web Address: [email or web address]  Follow up with your doctor or obstetrician as directed:  You will need to follow up within 2 to 6 weeks of delivery. Write down your questions so you remember to ask them at your visits.  © Copyright [employer] 2023 Information is for End User's use only and may not be sold, redistributed or otherwise used for commercial purposes.  The above information is an  only. It is not intended as medical advice for individual conditions or treatments. Talk to your doctor, nurse or pharmacist before following any medical regimen to see if it is safe and effective for you.

## 2024-03-26 ENCOUNTER — POSTPARTUM VISIT (OUTPATIENT)
Age: 29
End: 2024-03-26
Payer: COMMERCIAL

## 2024-03-26 VITALS
DIASTOLIC BLOOD PRESSURE: 70 MMHG | HEIGHT: 64 IN | WEIGHT: 134.4 LBS | BODY MASS INDEX: 22.94 KG/M2 | SYSTOLIC BLOOD PRESSURE: 104 MMHG

## 2024-03-26 DIAGNOSIS — R19.8 RECTAL PRESSURE: ICD-10-CM
